# Patient Record
Sex: MALE | Race: WHITE | NOT HISPANIC OR LATINO | Employment: FULL TIME | URBAN - METROPOLITAN AREA
[De-identification: names, ages, dates, MRNs, and addresses within clinical notes are randomized per-mention and may not be internally consistent; named-entity substitution may affect disease eponyms.]

---

## 2017-05-04 ENCOUNTER — OFFICE VISIT (OUTPATIENT)
Dept: FAMILY MEDICINE | Facility: OTHER | Age: 37
End: 2017-05-04
Payer: COMMERCIAL

## 2017-05-04 VITALS
HEIGHT: 67 IN | WEIGHT: 165 LBS | BODY MASS INDEX: 25.9 KG/M2 | SYSTOLIC BLOOD PRESSURE: 134 MMHG | OXYGEN SATURATION: 98 % | HEART RATE: 61 BPM | DIASTOLIC BLOOD PRESSURE: 87 MMHG

## 2017-05-04 DIAGNOSIS — R42 DIZZINESS: ICD-10-CM

## 2017-05-04 DIAGNOSIS — R20.9 DISTURBANCE OF SKIN SENSATION: ICD-10-CM

## 2017-05-04 DIAGNOSIS — R23.2 HOT FLASHES: ICD-10-CM

## 2017-05-04 DIAGNOSIS — R20.0 NUMBNESS: Primary | ICD-10-CM

## 2017-05-04 LAB
ALBUMIN SERPL-MCNC: 4 G/DL (ref 3.4–5)
ALP SERPL-CCNC: 107 U/L (ref 40–150)
ALT SERPL W P-5'-P-CCNC: 58 U/L (ref 0–70)
ANION GAP SERPL CALCULATED.3IONS-SCNC: 10 MMOL/L (ref 3–14)
AST SERPL W P-5'-P-CCNC: 25 U/L (ref 0–45)
BILIRUB SERPL-MCNC: 1 MG/DL (ref 0.2–1.3)
BUN SERPL-MCNC: 13 MG/DL (ref 7–30)
CALCIUM SERPL-MCNC: 8.7 MG/DL (ref 8.5–10.1)
CHLORIDE SERPL-SCNC: 104 MMOL/L (ref 94–109)
CO2 SERPL-SCNC: 25 MMOL/L (ref 20–32)
CREAT SERPL-MCNC: 0.64 MG/DL (ref 0.66–1.25)
D DIMER PPP FEU-MCNC: 0.3 UG/ML FEU (ref 0–0.5)
ERYTHROCYTE [DISTWIDTH] IN BLOOD BY AUTOMATED COUNT: 12.8 % (ref 10–15)
GFR SERPL CREATININE-BSD FRML MDRD: ABNORMAL ML/MIN/1.7M2
GLUCOSE SERPL-MCNC: 122 MG/DL (ref 70–99)
HCT VFR BLD AUTO: 43.3 % (ref 40–53)
HGB BLD-MCNC: 15.5 G/DL (ref 13.3–17.7)
MCH RBC QN AUTO: 29.3 PG (ref 26.5–33)
MCHC RBC AUTO-ENTMCNC: 35.8 G/DL (ref 31.5–36.5)
MCV RBC AUTO: 82 FL (ref 78–100)
PLATELET # BLD AUTO: 205 10E9/L (ref 150–450)
POTASSIUM SERPL-SCNC: 4.1 MMOL/L (ref 3.4–5.3)
PROT SERPL-MCNC: 7.8 G/DL (ref 6.8–8.8)
RBC # BLD AUTO: 5.29 10E12/L (ref 4.4–5.9)
SODIUM SERPL-SCNC: 139 MMOL/L (ref 133–144)
TSH SERPL DL<=0.005 MIU/L-ACNC: 1.98 MU/L (ref 0.4–4)
WBC # BLD AUTO: 4.8 10E9/L (ref 4–11)

## 2017-05-04 PROCEDURE — 99214 OFFICE O/P EST MOD 30 MIN: CPT | Performed by: FAMILY MEDICINE

## 2017-05-04 PROCEDURE — 85379 FIBRIN DEGRADATION QUANT: CPT | Performed by: FAMILY MEDICINE

## 2017-05-04 PROCEDURE — 93000 ELECTROCARDIOGRAM COMPLETE: CPT | Performed by: FAMILY MEDICINE

## 2017-05-04 PROCEDURE — 36415 COLL VENOUS BLD VENIPUNCTURE: CPT | Performed by: FAMILY MEDICINE

## 2017-05-04 PROCEDURE — 80050 GENERAL HEALTH PANEL: CPT | Performed by: FAMILY MEDICINE

## 2017-05-04 ASSESSMENT — ANXIETY QUESTIONNAIRES
5. BEING SO RESTLESS THAT IT IS HARD TO SIT STILL: NOT AT ALL
IF YOU CHECKED OFF ANY PROBLEMS ON THIS QUESTIONNAIRE, HOW DIFFICULT HAVE THESE PROBLEMS MADE IT FOR YOU TO DO YOUR WORK, TAKE CARE OF THINGS AT HOME, OR GET ALONG WITH OTHER PEOPLE: NOT DIFFICULT AT ALL
2. NOT BEING ABLE TO STOP OR CONTROL WORRYING: NOT AT ALL
7. FEELING AFRAID AS IF SOMETHING AWFUL MIGHT HAPPEN: NOT AT ALL
6. BECOMING EASILY ANNOYED OR IRRITABLE: NOT AT ALL
3. WORRYING TOO MUCH ABOUT DIFFERENT THINGS: SEVERAL DAYS
GAD7 TOTAL SCORE: 1
1. FEELING NERVOUS, ANXIOUS, OR ON EDGE: NOT AT ALL

## 2017-05-04 ASSESSMENT — PAIN SCALES - GENERAL: PAINLEVEL: NO PAIN (0)

## 2017-05-04 ASSESSMENT — PATIENT HEALTH QUESTIONNAIRE - PHQ9: 5. POOR APPETITE OR OVEREATING: NOT AT ALL

## 2017-05-04 NOTE — PROGRESS NOTES
SUBJECTIVE:                                                    Catrachito Sawant is a 37 year old male who presents to clinic today for the following health issues:      Dizziness     Onset: Today driving to work     Description:   Do you feel faint:  YES  Does it feel like the surroundings (bed, room) are moving: YES  Unsteady/off balance: YES  Have you passed out or fallen: no - felt like he was going to pass out or fall asleep     Intensity: moderate    Progression of Symptoms:  improving    Accompanying Signs & Symptoms:  Heart palpitations: Unsure  Nausea, vomiting: YES  Weakness in arms or legs: YES  Fatigue: YES  Vision or speech changes: YES- blurry and things were spinning  Ringing in ears (Tinnitus): no   Hearing Loss: no    History:   Head trauma/concussion hx: no   Previous similar symptoms: YES- 1 month ago   Recent bleeding history: no     Precipitating factors:   Worse with activity or head movement: YES  Any new medications (BP?): no   Alcohol/drug abuse/withdrawal: no     Alleviating factors:   Does staying in a fixed position give relief:  YES       Therapies Tried and outcome: NA    Pt had some discomfort in his right groin on the way to work.  While driving, the pain got somewhat worse, started to feel hot, dizzy, arms started to tingle.      He pulled over, rested for a bit, thought he was going to pass out, and then came to the clinic to be evaluated.  He'd also tried drinking a soda to see if that would help and didn't note any improvement.    He had something similar happen a few weeks ago on the way home from work that he attributed to stress from a long day at work.  This event lasted about 30 minutes.      Problem list and histories reviewed & adjusted, as indicated.  Additional history: as documented    No current outpatient prescriptions on file.     No Known Allergies  No lab results found.   BP Readings from Last 3 Encounters:   05/04/17 134/87   11/12/16 127/83   02/25/14 121/79    Wt  "Readings from Last 3 Encounters:   05/04/17 165 lb (74.8 kg)            Family History   Problem Relation Age of Onset     DIABETES Mother      DIABETES Brother      Coronary Artery Disease No family hx of          Social History   Substance Use Topics     Smoking status: Former Smoker     Start date: 1/1/2009     Smokeless tobacco: Never Used     Alcohol use No           Reviewed and updated as needed this visit by clinical staff  Tobacco  Allergies  Meds  Problems       Reviewed and updated as needed this visit by Provider  Allergies  Meds  Problems          ROS:  Constitutional, HEENT, cardiovascular, pulmonary, gi and gu systems are negative, except as otherwise noted.    OBJECTIVE:                                                    /87 (BP Location: Right arm, Patient Position: Chair, Cuff Size: Adult Regular)  Pulse 61  Ht 5' 7\" (1.702 m)  Wt 165 lb (74.8 kg)  SpO2 98%  BMI 25.84 kg/m2  Body mass index is 25.84 kg/(m^2).  GENERAL: healthy, alert and no distress  NECK: no adenopathy, no asymmetry, masses, or scars and thyroid normal to palpation  RESP: lungs clear to auscultation - no rales, rhonchi or wheezes  CV: regular rate and rhythm, normal S1 S2, no S3 or S4, no murmur, click or rub, no peripheral edema and peripheral pulses strong  ABDOMEN: soft, nontender, no hepatosplenomegaly, no masses and bowel sounds normal  MS: no gross musculoskeletal defects noted, no edema    Diagnostic Test Results:  Results for orders placed or performed in visit on 05/04/17   CBC with platelets   Result Value Ref Range    WBC 4.8 4.0 - 11.0 10e9/L    RBC Count 5.29 4.4 - 5.9 10e12/L    Hemoglobin 15.5 13.3 - 17.7 g/dL    Hematocrit 43.3 40.0 - 53.0 %    MCV 82 78 - 100 fl    MCH 29.3 26.5 - 33.0 pg    MCHC 35.8 31.5 - 36.5 g/dL    RDW 12.8 10.0 - 15.0 %    Platelet Count 205 150 - 450 10e9/L        ASSESSMENT/PLAN:                                                        ICD-10-CM    1. Numbness R20.0 EKG " 12-lead complete w/read - Clinics     Comprehensive metabolic panel     CBC with platelets     TSH with free T4 reflex     D dimer, quantitative   2. Dizziness R42 Comprehensive metabolic panel     CBC with platelets     TSH with free T4 reflex     D dimer, quantitative   3. Hot flashes R23.2 Comprehensive metabolic panel     CBC with platelets     TSH with free T4 reflex     D dimer, quantitative   4. Disturbance of skin sensation R20.9 Comprehensive metabolic panel     CBC with platelets     TSH with free T4 reflex     D dimer, quantitative     Unclear cause for pt's symptoms.  He doesn't have a lot of risk factors for MI, EKG is reassuring other than some flattening of T-waves.  Pt improved with rest.  Did give one dose of  just in case this was cardiac.  Suspect that this was either panic attack (but pt has no underlying anxiety based on MIRANDA-7) or vasovagal reaction, possibly to pain.  Will obtain labs to screen for some other possible causes.  If these are normal, will send pt for stress test.  Could also consider event monitor, but events have been fairly infrequent to date.  Follow up if not continuing to improve.            Patient Instructions   Thank you for visiting [facility]    We'll let you know your lab results as soon as we can.     Take a daily aspirin until stress test is done if all of your labs are normal.    If you have chest pain, shortness of breath or other clear worsening, then go to the ER.    Please make an appointment with your either myself or your provider  in 2-4 weeks for follow up.       If you had imaging scheduled please refer to your radiology prep sheet.    Appointment    Date_______________     Time_____________    Day:   M TU W TH F    With____________________________    Location_________________________    If you need medication refills, please contact your pharmacy 3 days before your prescriptions runs out. If you are out of refills, your  pharmacy will contact contact the clinic.    Contact us or return if questions or concerns.     -Your Care Team:  MD Melissa Ross PA-C Folake Falaki, MD Anoshirvan Mazhari, MD Kelly White, CNP    General information about your clinic      Clinic hours:     Lab hours:  Phone [phone]  Monday 7:30 am-7 pm    Monday 8:30 am-6:30 pm  Tuesday-Friday 7:30 am-5 pm   Tuesday-Friday 8:30 am-4:30 pm    Pharmacy hours:  Phone [phone]  Monday 8:30 am-7pm  Tuesday-Friday 8:30am-6 pm                                       Mychart assistance [phone]        We would like to hear from you, how was your visit today?    Sarah Robles  Patient Information Supervisor   Patient Care Supervisor  [facility], and [facility]  [phone]         Miguel Angel Staples MD, MD  [facility]

## 2017-05-04 NOTE — PROGRESS NOTES
Catrachito,    All of your labs were normal for you except blood sugar was a little high (possibly from your recent soda).  We'll get a stress test ordered for you.  Let me know if questions or concerns.    Have a nice day!    Dr. Staples

## 2017-05-04 NOTE — LETTER
[facility]  [id number] [place] [id number]  Phone: [phone]    May 4, 2017        Catrachito Sawant  [address] [place] [id number]          To whom it may concern:    RE: Catrachito Sawant    Patient was seen and treated today at our clinic and missed work.    Please contact me for questions or concerns.      Sincerely,        Miguel Angel Staples MD

## 2017-05-04 NOTE — PATIENT INSTRUCTIONS
Thank you for visiting [facility]    We'll let you know your lab results as soon as we can.     Take a daily aspirin until stress test is done if all of your labs are normal.    If you have chest pain, shortness of breath or other clear worsening, then go to the ER.    Please make an appointment with your either myself or your provider  in 2-4 weeks for follow up.       If you had imaging scheduled please refer to your radiology prep sheet.    Appointment    Date_______________     Time_____________    Day:   M TU W TH F    With____________________________    Location_________________________    If you need medication refills, please contact your pharmacy 3 days before your prescriptions runs out. If you are out of refills, your pharmacy will contact contact the clinic.    Contact us or return if questions or concerns.     -Your Care Team:  MD Melissa Ross PA-C Folake Falaki, MD Anoshirvan Mazhari, MD Kelly White, BRYANNA    General information about your clinic      Clinic hours:     Lab hours:  Phone [phone]  Monday 7:30 am-7 pm    Monday 8:30 am-6:30 pm  Tuesday-Friday 7:30 am-5 pm   Tuesday-Friday 8:30 am-4:30 pm    Pharmacy hours:  Phone [phone]  Monday 8:30 am-7pm  Tuesday-Friday 8:30am-6 pm                                       Mychart assistance [phone]        We would like to hear from you, how was your visit today?    Sarah Robles  Patient Information Supervisor   Patient Care Supervisor  [facility] [place], and [facility] [place], and [facility]  [phone]

## 2017-05-04 NOTE — NURSING NOTE
"Chief Complaint   Patient presents with     Dizziness     Numbness       Initial /87 (BP Location: Right arm, Patient Position: Chair, Cuff Size: Adult Regular)  Pulse 61  Ht 5' 7\" (1.702 m)  Wt 165 lb (74.8 kg)  SpO2 98%  BMI 25.84 kg/m2 Estimated body mass index is 25.84 kg/(m^2) as calculated from the following:    Height as of this encounter: 5' 7\" (1.702 m).    Weight as of this encounter: 165 lb (74.8 kg).  Medication Reconciliation: complete   Kimani Villasenor MA  May 4, 2017      "

## 2017-05-04 NOTE — MR AVS SNAPSHOT
After Visit Summary   5/4/2017    Catrachito Sawant    MRN: [id number]           Patient Information     Date Of Birth          1980        Visit Information        Provider Department      5/4/2017 9:30 AM Miguel Angel Staples MD [facility]        Today's Diagnoses     Numbness    -  1    Hot flashes        Disturbance of skin sensation        Dizziness          Care Instructions    Thank you for visiting [facility]    We'll let you know your lab results as soon as we can.     Take a daily aspirin until stress test is done if all of your labs are normal.    If you have chest pain, shortness of breath or other clear worsening, then go to the ER.    Please make an appointment with your either myself or your provider  in 2-4 weeks for follow up.       If you had imaging scheduled please refer to your radiology prep sheet.    Appointment    Date_______________     Time_____________    Day:   M TU W TH F    With____________________________    Location_________________________    If you need medication refills, please contact your pharmacy 3 days before your prescriptions runs out. If you are out of refills, your pharmacy will contact contact the clinic.    Contact us or return if questions or concerns.     -Your Care Team:  MD Melissa Ross PA-C Folake Falaki, MD Anoshirvan Mazhari, MD Kelly White, CNP    General information about your clinic      Clinic hours:     Lab hours:  Phone [phone]  Monday 7:30 am-7 pm    Monday 8:30 am-6:30 pm  Tuesday-Friday 7:30 am-5 pm   Tuesday-Friday 8:30 am-4:30 pm    Pharmacy hours:  Phone [phone]  Monday 8:30 am-7pm  Tuesday-Friday 8:30am-6 pm                                       Mychart assistance [phone]        We would like to hear from you, how was your visit today?    Sarah Robles  Patient Information Supervisor   Patient Care Supervisor  [facility],  "and [facility]  [phone]           Follow-ups after your visit        Who to contact     If you have questions or need follow up information about today's clinic visit or your schedule please contact [facility] directly at [phone].  Normal or non-critical lab and imaging results will be communicated to you by MyChart, letter or phone within 4 business days after the clinic has received the results. If you do not hear from us within 7 days, please contact the clinic through [facility] or phone. If you have a critical or abnormal lab result, we will notify you by phone as soon as possible.  Submit refill requests through [employer] or call your pharmacy and they will forward the refill request to us. Please allow 3 business days for your refill to be completed.          Additional Information About Your Visit        Fantex Information     Fantex lets you send messages to your doctor, view your test results, renew your prescriptions, schedule appointments and more. To sign up, go to [email or web address] . Click on \"Log in\" on the left side of the screen, which will take you to the Welcome page. Then click on \"Sign up Now\" on the right side of the page.     You will be asked to enter the access code listed below, as well as some personal information. Please follow the directions to create your username and password.     Your access code is: [email or web address]  Expires: 2017  9:55 AM     Your access code will  in 90 days. If you need help or a new code, please call your [facility] or [phone].        Care EveryWhere ID     This is your Care EveryWhere ID. This could be used by other organizations to access your [place] medical records  [id number]        Your Vitals Were     Pulse Height Pulse Oximetry BMI (Body Mass Index)          61 5' 7\" (1.702 m) 98% 25.84 kg/m2         Blood Pressure from Last 3 Encounters: "   05/04/17 134/87   11/12/16 127/83   02/25/14 121/79    Weight from Last 3 Encounters:   05/04/17 165 lb (74.8 kg)              We Performed the Following     CBC with platelets     Comprehensive metabolic panel     D dimer, quantitative     EKG 12-lead complete w/read - Clinics     TSH with free T4 reflex        Primary Care Provider Office Phone #    [facility] [phone]       No address on file        Thank you!     Thank you for choosing [facility]  for your care. Our goal is always to provide you with excellent care. Hearing back from our patients is one way we can continue to improve our services. Please take a few minutes to complete the written survey that you may receive in the mail after your visit with us. Thank you!             Your Updated Medication List - Protect others around you: Learn how to safely use, store and throw away your medicines at www.disposemymeds.org.      Notice  As of 5/4/2017  9:55 AM    You have not been prescribed any medications.

## 2017-05-05 ASSESSMENT — ANXIETY QUESTIONNAIRES: GAD7 TOTAL SCORE: 1

## 2017-05-05 ASSESSMENT — PATIENT HEALTH QUESTIONNAIRE - PHQ9: SUM OF ALL RESPONSES TO PHQ QUESTIONS 1-9: 1

## 2017-05-09 ENCOUNTER — TELEPHONE (OUTPATIENT)
Dept: FAMILY MEDICINE | Facility: OTHER | Age: 37
End: 2017-05-09

## 2017-05-09 DIAGNOSIS — R42 DIZZINESS: Primary | ICD-10-CM

## 2017-05-09 NOTE — TELEPHONE ENCOUNTER
ICD-10-CM    1. Dizziness R42 Exercise Stress Echocardiogram     Order placed, please set up appointment for patient.  Thank you.    Electronically signed:  Keli Mackay M.D.

## 2017-05-09 NOTE — TELEPHONE ENCOUNTER
Patient went to schedule stress test, there was no order placed. It is in patients chart that Dr. Staples wanted patient to do this will order the test so patient can schedule. Please place orders and sign.  Rima Elizalde CMA ([facility])

## 2017-05-09 NOTE — TELEPHONE ENCOUNTER
Left message for patient to call, please help him schedule Stress Echo, order is placed or have patient call [phone], thanks  Sheree Muñoz RT (R)

## 2017-05-10 NOTE — TELEPHONE ENCOUNTER
LMTC: See message below. Patient has already scheduled test will close encounter. If patient returns call please inform him we were attempting to reach him to let him know the order had been signed.  Rima Elizalde CMA ([facility])

## 2017-05-17 ENCOUNTER — HOSPITAL ENCOUNTER (OUTPATIENT)
Dept: CARDIOLOGY | Facility: CLINIC | Age: 37
Discharge: HOME | End: 2017-05-17
Attending: FAMILY MEDICINE | Admitting: FAMILY MEDICINE
Payer: COMMERCIAL

## 2017-05-17 DIAGNOSIS — R42 DIZZINESS: ICD-10-CM

## 2017-05-17 PROCEDURE — 93321 DOPPLER ECHO F-UP/LMTD STD: CPT | Mod: 26 | Performed by: INTERNAL MEDICINE

## 2017-05-17 PROCEDURE — 93016 CV STRESS TEST SUPVJ ONLY: CPT | Performed by: OBSTETRICS & GYNECOLOGY

## 2017-05-17 PROCEDURE — 93018 CV STRESS TEST I&R ONLY: CPT | Performed by: INTERNAL MEDICINE

## 2017-05-17 PROCEDURE — 93325 DOPPLER ECHO COLOR FLOW MAPG: CPT | Mod: 26 | Performed by: INTERNAL MEDICINE

## 2017-05-17 PROCEDURE — 93017 CV STRESS TEST TRACING ONLY: CPT | Performed by: REHABILITATION PRACTITIONER

## 2017-05-17 PROCEDURE — 93016 CV STRESS TEST SUPVJ ONLY: CPT | Performed by: INTERNAL MEDICINE

## 2017-05-17 PROCEDURE — 40000264 ECHO STRESS WITH OPTISON

## 2017-05-17 PROCEDURE — 93350 STRESS TTE ONLY: CPT | Mod: 26 | Performed by: INTERNAL MEDICINE

## 2017-05-17 PROCEDURE — 25500064 ZZH RX 255 OP 636: Performed by: FAMILY MEDICINE

## 2017-05-17 PROCEDURE — 93018 CV STRESS TEST I&R ONLY: CPT | Performed by: OBSTETRICS & GYNECOLOGY

## 2017-05-17 RX ADMIN — HUMAN ALBUMIN MICROSPHERES AND PERFLUTREN 3 ML: 10; .22 INJECTION, SOLUTION INTRAVENOUS at 09:24

## 2017-05-19 ENCOUNTER — TELEPHONE (OUTPATIENT)
Dept: FAMILY MEDICINE | Facility: OTHER | Age: 37
End: 2017-05-19

## 2017-05-19 NOTE — TELEPHONE ENCOUNTER
LMTC, please advise message below  Sheree Muñoz RT (R)      Notes Recorded by Keli Mackay MD on 5/19/2017 at 1:20 PM  Stress echo with in normal range.  If symptoms persists, please notify and follow up.  If any questions, I can call.    Electronically signed:  Keli Mackay M.D.

## 2017-07-26 ENCOUNTER — TELEPHONE (OUTPATIENT)
Dept: FAMILY MEDICINE | Facility: CLINIC | Age: 37
End: 2017-07-26

## 2017-07-26 NOTE — TELEPHONE ENCOUNTER
Per Cassandra Tavera can see patient on August 17th at 2, please make for a 40 minute appointment and route it back to Cassandra so they can send out paperwork to the patient

## 2017-07-26 NOTE — TELEPHONE ENCOUNTER
Reason for Call:  Same Day Appointment, Requested Provider:  Jesse Tavera M.D.    PCP: Clinic, [facility]    Reason for visit: work in-patient is calling requesting a vas but first available for a consult isnt until October. Wondering if he could do consult and procedure in the same day prior to his wifes due date which is about September 13th. Aware that AF is out until 7/31 please advise when availabel    Duration of symptoms:     Have you been treated for this in the past? No    Additional comments: patient states that his wife sees Dr. Tavera and requesting to see him for this    Can we leave a detailed message on this number? YES    Phone number patient can be reached at: Home number on file [phone] (home)    Best Time: any    Call taken on 7/26/2017 at 8:51 AM by Marlyn Mariano

## 2017-08-02 ENCOUNTER — OFFICE VISIT (OUTPATIENT)
Dept: FAMILY MEDICINE | Facility: CLINIC | Age: 37
End: 2017-08-02
Payer: COMMERCIAL

## 2017-08-02 VITALS
DIASTOLIC BLOOD PRESSURE: 84 MMHG | OXYGEN SATURATION: 98 % | RESPIRATION RATE: 20 BRPM | SYSTOLIC BLOOD PRESSURE: 114 MMHG | WEIGHT: 151.9 LBS | TEMPERATURE: 98.1 F | BODY MASS INDEX: 23.79 KG/M2 | HEART RATE: 60 BPM

## 2017-08-02 DIAGNOSIS — Z30.2 ENCOUNTER FOR VASECTOMY: Primary | ICD-10-CM

## 2017-08-02 PROCEDURE — 99213 OFFICE O/P EST LOW 20 MIN: CPT | Performed by: FAMILY MEDICINE

## 2017-08-02 RX ORDER — ALPRAZOLAM 1 MG
TABLET ORAL
Qty: 2 TABLET | Refills: 0 | Status: SHIPPED | OUTPATIENT
Start: 2017-08-02 | End: 2019-12-12

## 2017-08-02 ASSESSMENT — PAIN SCALES - GENERAL: PAINLEVEL: NO PAIN (0)

## 2017-08-02 NOTE — NURSING NOTE
"Chief Complaint   Patient presents with     Consult     vasectomy       Initial /84 (BP Location: Left arm, Patient Position: Chair, Cuff Size: Adult Regular)  Pulse 60  Temp 98.1  F (36.7  C) (Temporal)  Resp 20  Wt 151 lb 14.4 oz (68.9 kg)  SpO2 98%  BMI 23.79 kg/m2 Estimated body mass index is 23.79 kg/(m^2) as calculated from the following:    Height as of 5/4/17: 5' 7\" (1.702 m).    Weight as of this encounter: 151 lb 14.4 oz (68.9 kg).  Medication Reconciliation: complete   Anushka Avina CMA     "

## 2017-08-02 NOTE — MR AVS SNAPSHOT
After Visit Summary   8/2/2017    Catrachito Sawant    MRN: [id number]           Patient Information     Date Of Birth          1980        Visit Information        Provider Department      8/2/2017 9:00 AM Alex Haley MD [facility]        Today's Diagnoses     Encounter for vasectomy    -  1       Follow-ups after your visit        Your next 10 appointments already scheduled     Aug 11, 2017  2:40 PM CDT   Vasectomy with Alex Haley MD, NL PROC ROOM ONE [facility]    [address] [place] [id number] [phone]              Who to contact     If you have questions or need follow up information about today's clinic visit or your schedule please contact [facility] directly at [phone].  Normal or non-critical lab and imaging results will be communicated to you by MyChart, letter or phone within 4 business days after the clinic has received the results. If you do not hear from us within 7 days, please contact the clinic through MyChart or phone. If you have a critical or abnormal lab result, we will notify you by phone as soon as possible.  Submit refill requests through [employer] or call your pharmacy and they will forward the refill request to us. Please allow 3 business days for your refill to be completed.          Additional Information About Your Visit        MyChart Information     [employer] gives you secure access to your electronic health record. If you see a primary care provider, you can also send messages to your care team and make appointments. If you have questions, please call your primary care clinic.  If you do not have a primary care provider, please call [phone] and they will assist you.        Care EveryWhere ID     This is your Care EveryWhere ID. This could be used by other organizations to access your [place] medical records  [id number]        Your  Vitals Were     Pulse Temperature Respirations Pulse Oximetry BMI (Body Mass Index)       60 98.1  F (36.7  C) (Temporal) 20 98% 23.79 kg/m2        Blood Pressure from Last 3 Encounters:   08/02/17 114/84   05/04/17 134/87   11/12/16 127/83    Weight from Last 3 Encounters:   08/02/17 151 lb 14.4 oz (68.9 kg)   05/04/17 165 lb (74.8 kg)              Today, you had the following     No orders found for display         Today's Medication Changes          These changes are accurate as of: 8/2/17 12:52 PM.  If you have any questions, ask your nurse or doctor.               Start taking these medicines.        Dose/Directions    ALPRAZolam 1 MG tablet   Commonly known as:  XANAX   Used for:  Encounter for vasectomy   Started by:  Alex Haley MD        Take one dose 30 min prior to procedure, then repeat at procedure if not relaxed   Quantity:  2 tablet   Refills:  0            Where to get your medicines      Some of these will need a paper prescription and others can be bought over the counter.  Ask your nurse if you have questions.     Bring a paper prescription for each of these medications     ALPRAZolam 1 MG tablet                Primary Care Provider Office Phone #    [facility] [phone]       No address on file        Equal Access to Services     MACHO ALEJANDRE AH: Jessica knutsono Sojad, waaxda luqadaha, qaybta kaalmada adeegyada, robyn doran. So [facility] [phone].    ATENCIÓN: Si habla español, tiene a butt disposición servicios gratuitos de asistencia lingüística. Llame al [phone].    We comply with applicable federal civil rights laws and [place] laws. We do not discriminate on the basis of race, color, national origin, age, disability sex, sexual orientation or gender identity.            Thank you!     Thank you for choosing [facility]  for your care. Our goal is always to provide you with excellent care. Hearing  back from our patients is one way we can continue to improve our services. Please take a few minutes to complete the written survey that you may receive in the mail after your visit with us. Thank you!             Your Updated Medication List - Protect others around you: Learn how to safely use, store and throw away your medicines at www.disposemymeds.org.          This list is accurate as of: 8/2/17 12:52 PM.  Always use your most recent med list.                   Brand Name Dispense Instructions for use Diagnosis    ALPRAZolam 1 MG tablet    XANAX    2 tablet    Take one dose 30 min prior to procedure, then repeat at procedure if not relaxed    Encounter for vasectomy

## 2017-08-02 NOTE — PROGRESS NOTES
SUBJECTIVE:  This 37 year old male is in for a vasectomy consultation.  He and his partner have decided they don't want to have any more children. They have 2 children.  We talked about the risks and benefits of the vasectomy; risks being that of potential for bleeding, infection, postoperative discomfort immediately which can last for a number of weeks afterwards, also the development of spermatoceles or sperm granulomas, epididymal cysts and the possibility of failure of the procedure producing failed attempt at sterilization. Number quoted was 1 out of 1000 risk of failure. I went through the procedure with the patient, describing the no scapel technique. No prior procedures on his genitalia.    OBJECTIVE:  On exam, this is a well-developed, well-nourished white male.    /84 (BP Location: Left arm, Patient Position: Chair, Cuff Size: Adult Regular)  Pulse 60  Temp 98.1  F (36.7  C) (Temporal)  Resp 20  Wt 151 lb 14.4 oz (68.9 kg)  SpO2 98%  BMI 23.79 kg/m2    Exam reveals a normal penis, no lesions.  Testes are descended bilaterally.  Both vas deferens were easily identified.  No other abnormalities were noted.      ASSESSMENT:  Undesired male fertility, consult for vasectomy    PLAN:  He will schedule a vasectomy at his convenience for 11 am on Fridays.  He is aware that he will need to take the entire weekend off and have essentially bedrest for two days with ice packs 20-30 minutes out of every hour and ibuprofen for discomfort. He should be on 600-800mg ibuprofen every 6 hrs.  He will take alprazolam 1 mg and repeat that if he needs a second dose once he gets to the clinic. He will contact his insurance for coverage issues and waiting period information.  He left with written instructions on the procedure, recovery, risks and benefits.

## 2017-08-11 ENCOUNTER — OFFICE VISIT (OUTPATIENT)
Dept: FAMILY MEDICINE | Facility: CLINIC | Age: 37
End: 2017-08-11
Payer: COMMERCIAL

## 2017-08-11 DIAGNOSIS — Z30.2 ENCOUNTER FOR VASECTOMY: Primary | ICD-10-CM

## 2017-08-11 PROCEDURE — 88302 TISSUE EXAM BY PATHOLOGIST: CPT | Performed by: FAMILY MEDICINE

## 2017-08-11 PROCEDURE — 55250 REMOVAL OF SPERM DUCT(S): CPT | Performed by: FAMILY MEDICINE

## 2017-08-11 NOTE — MR AVS SNAPSHOT
After Visit Summary   8/11/2017    Catrachito Sawant    MRN: [id number]           Patient Information     Date Of Birth          1980        Visit Information        Provider Department      8/11/2017 2:40 PM Alex Haley MD; NL PROC ROOM ONE [facility]        Today's Diagnoses     Encounter for vasectomy    -  1       Follow-ups after your visit        Who to contact     If you have questions or need follow up information about today's clinic visit or your schedule please contact [facility] directly at [phone].  Normal or non-critical lab and imaging results will be communicated to you by [employer], letter or phone within 4 business days after the clinic has received the results. If you do not hear from us within 7 days, please contact the clinic through [employer] or phone. If you have a critical or abnormal lab result, we will notify you by phone as soon as possible.  Submit refill requests through [employer] or call your pharmacy and they will forward the refill request to us. Please allow 3 business days for your refill to be completed.          Additional Information About Your Visit        MyChart Information     [employer] gives you secure access to your electronic health record. If you see a primary care provider, you can also send messages to your care team and make appointments. If you have questions, please call your primary care clinic.  If you do not have a primary care provider, please call [phone] and they will assist you.        Care EveryWhere ID     This is your Care EveryWhere ID. This could be used by other organizations to access your [facility] medical records  [id number]         Blood Pressure from Last 3 Encounters:   08/02/17 114/84   05/04/17 134/87   11/12/16 127/83    Weight from Last 3 Encounters:   08/02/17 151 lb 14.4 oz (68.9 kg)   05/04/17 165 lb (74.8 kg)              We Performed the Following     Surgical pathology  exam     VASECTOMY UNILAT/BILAT W POSTOP SEMEN        Primary Care Provider Office Phone #    [facility] [phone]       No address on file        Equal Access to Services     MACHO ALEJANDRE : Hadii aad ku hadodalisbianca Pattjad, wadinoda bomerle, qaharita kaalmada lazaro, robyn vásquez laMinegayatri doran. So [facility] [phone].    ATENCIÓN: Si habla español, tiene a butt disposición servicios gratuitos de asistencia lingüística. Llame al [phone].    We comply with applicable federal civil rights laws and [place] laws. We do not discriminate on the basis of race, color, national origin, age, disability sex, sexual orientation or gender identity.            Thank you!     Thank you for choosing [facility]  for your care. Our goal is always to provide you with excellent care. Hearing back from our patients is one way we can continue to improve our services. Please take a few minutes to complete the written survey that you may receive in the mail after your visit with us. Thank you!             Your Updated Medication List - Protect others around you: Learn how to safely use, store and throw away your medicines at www.disposemymeds.org.          This list is accurate as of: 8/11/17  4:54 PM.  Always use your most recent med list.                   Brand Name Dispense Instructions for use Diagnosis    ALPRAZolam 1 MG tablet    XANAX    2 tablet    Take one dose 30 min prior to procedure, then repeat at procedure if not relaxed    Encounter for vasectomy

## 2017-08-11 NOTE — LETTER
[address] [address]  [phone]        August 11, 2017    Regarding:  Catrachito Sawant  [address] [place] [address]              To Whom It May Concern;  Please make him light duty for the next week. Thanks.          Sincerely,        Alex Haley MD

## 2017-08-11 NOTE — PROGRESS NOTES
S:   Catrachito Sawant  comes in today for a vasectomy. The patient had previously been in and we discussed the other options for birth control, the risks and benefits of the procedure. Details of those risks and benefits have been noted in the consent form which has been signed. This includes the potential for bleeding, infection, bruising, potential for sperm granuloma/epididymitis, and failure.    Procedure:   The patient was placed in a supine position on the procedure table. Shave prep was done by the patient at home. He was then sterilely prepped and draped in the usual fashion. The left vas was first identified and brought up to the midline raphae where a small wheal of 1% Lidocaine with epinephrine was placed in the skin overlying the vas and further anesthesia was injected in to the vas sheaths bilaterally. The no scalpel dissector was used to create a skin rent in the midline. The vas clamp was then used to grasp the vas. When a segment of vas was clearly freed from the adventitia and vascular bundle, it was transected with a scissors and each end was cauterized approximately 1cm down into the vas deferens itself to seal it. A small section was sent for pathology identification. The prostatic end was buried within the vas sheath with a purse string suture of 3-0 chromic. No bleeding was noted at the completion of this step and at this time the vasdeferens was returned to the scrotal sac.     Attention was then turned to the opposite side and proceeded in similar fashion. The vas was brought up through the same opening. I proceeded in the same fashion to isolate, transect, cauterize, and bury the right side. The scrotal skin incision was not closed at completion of the procedure. He was placed in a sterile bandage. Final EBL was minimal. Scrotum appeared normal, outside some mild swelling in the area of lidocaine injection.    ASSESSMENT:   1) Uncomplicated male sterilization via vasectomy.     PLAN:   Patient  tolerated the procedure quite well. He will use Ibuprofen 800 mg. p.o. t.i.d. with food x 2 wks. Ice to the scrotum 30 minutes out of every hour for the next 48 hrs. No heavy lifting for three days. The patient will bring in a sample of semen for analysis after a minimum of 10 wks, and will contact me if any problems should arise.

## 2017-08-15 LAB — COPATH REPORT: NORMAL

## 2017-09-26 ENCOUNTER — TELEPHONE (OUTPATIENT)
Dept: FAMILY MEDICINE | Facility: CLINIC | Age: 37
End: 2017-09-26

## 2017-09-26 NOTE — TELEPHONE ENCOUNTER
Reason for Call:  Other call back    Detailed comments: patient is calling with questions in regards to his vas that was done in August. Wondering when he needs to bring a sample in and if he needs to pick anything up for that?    Phone Number Patient can be reached at: Home number on file [phone] (home)    Best Time: any    Can we leave a detailed message on this number? YES    Call taken on 9/26/2017 at 9:08 AM by Marlyn Mariano

## 2017-09-26 NOTE — TELEPHONE ENCOUNTER
Tried to reach patient, left message for patient to call the clinic back.  Patient should bring in the sample within a minimum of 10 weeks from the procedure. Patient was given some sample jars at the time of the procedure, he is to use those. If he does not have those any longer he can stop by the lab and get some again and request some labels for them.    Nancy Garcia, [facility]

## 2017-10-23 ENCOUNTER — TELEPHONE (OUTPATIENT)
Dept: FAMILY MEDICINE | Facility: CLINIC | Age: 37
End: 2017-10-23

## 2017-10-23 DIAGNOSIS — Z98.52 STATUS POST VASECTOMY: ICD-10-CM

## 2017-10-23 DIAGNOSIS — Z98.52 STATUS POST VASECTOMY: Primary | ICD-10-CM

## 2017-10-23 LAB — SPERM P VAS SMN QL MICRO: NORMAL

## 2017-10-23 PROCEDURE — 89321 SEMEN ANAL SPERM DETECTION: CPT | Performed by: FAMILY MEDICINE

## 2017-10-23 NOTE — TELEPHONE ENCOUNTER
Lab calling and stating patient presented to lab with semen sample. Lab needing orders for PVASQ. Order placed, lab questioning if patient needs any further samples brought in? Informed we will wait for results and provider will inform of any further samples being needed.  Nati Almanza LPN

## 2018-08-20 ENCOUNTER — OFFICE VISIT (OUTPATIENT)
Dept: URGENT CARE | Facility: RETAIL CLINIC | Age: 38
End: 2018-08-20
Payer: COMMERCIAL

## 2018-08-20 VITALS
HEART RATE: 66 BPM | SYSTOLIC BLOOD PRESSURE: 113 MMHG | DIASTOLIC BLOOD PRESSURE: 78 MMHG | TEMPERATURE: 98.3 F | OXYGEN SATURATION: 98 %

## 2018-08-20 DIAGNOSIS — B34.9 VIRAL SYNDROME: Primary | ICD-10-CM

## 2018-08-20 PROCEDURE — 99213 OFFICE O/P EST LOW 20 MIN: CPT | Performed by: FAMILY MEDICINE

## 2018-08-20 NOTE — PROGRESS NOTES
SUBJECTIVE:  Catrachito Sawant is a 38 year old male who presents with right ear pain and pressure for 3 day(s).   Severity: mild   Timing:gradual onset and still present  Additional symptoms include sneezing and sore throat.      History of recurrent otitis: no    No past medical history on file.  Current Outpatient Prescriptions   Medication Sig Dispense Refill     ALPRAZolam (XANAX) 1 MG tablet Take one dose 30 min prior to procedure, then repeat at procedure if not relaxed (Patient not taking: Reported on 8/20/2018) 2 tablet 0     History   Smoking Status     Former Smoker     Start date: 1/1/2009   Smokeless Tobacco     Never Used       ROS:   Review of systems negative except as stated above.    OBJECTIVE:  /78  Pulse 66  Temp 98.3  F (36.8  C) (Oral)  SpO2 98%  The right TM is normal: no effusions, no erythema, and normal landmarks     The right auditory canal is normal and without drainage, edema or erythema  The left TM is normal: no effusions, no erythema, and normal landmarks  The left auditory canal is normal and without drainage, edema or erythema  Oropharynx exam is normal: no lesions, erythema, adenopathy or exudate.  GENERAL: no acute distress  EYES: EOMI,  PERRL, conjunctiva clear  NECK: supple, non-tender to palpation, no adenopathy noted  RESP: lungs clear to auscultation - no rales, rhonchi or wheezes  CV: regular rates and rhythm, normal S1 S2, no murmur noted  SKIN: no suspicious lesions or rashes     ASSESSMENT:  URI    PLAN:  No orders of the defined types were placed in this encounter.    Follow up with primary care provider if no improvement.

## 2018-08-20 NOTE — MR AVS SNAPSHOT
After Visit Summary   8/20/2018    Catrachito Sawant    MRN: [id number]           Patient Information     Date Of Birth          1980        Visit Information        Provider Department      8/20/2018 9:10 AM Dawson Villanueva MD [facility]        Today's Diagnoses     Viral syndrome    -  1       Follow-ups after your visit        Who to contact     You can reach your care team any time of the day by calling [phone].  Notification of test results:  If you have an abnormal lab result, we will notify you by phone as soon as possible.         Additional Information About Your Visit        MyChart Information     [employer] gives you secure access to your electronic health record. If you see a primary care provider, you can also send messages to your care team and make appointments. If you have questions, please call your primary care clinic.  If you do not have a primary care provider, please call [phone] and they will assist you.        Care EveryWhere ID     This is your Care EveryWhere ID. This could be used by other organizations to access your [facility] medical records  [id number]        Your Vitals Were     Pulse Temperature Pulse Oximetry             66 98.3  F (36.8  C) (Oral) 98%          Blood Pressure from Last 3 Encounters:   08/20/18 113/78   08/02/17 114/84   05/04/17 134/87    Weight from Last 3 Encounters:   08/02/17 151 lb 14.4 oz (68.9 kg)   05/04/17 165 lb (74.8 kg)              Today, you had the following     No orders found for display       Primary Care Provider Fax #    Physician No Ref-Primary [phone]       No address on file        Equal Access to Services     TARUN ALEJANDRE : Hadii aad ku hadasho Soomaali, waaxda luqadaha, qaybta kaalmada adeegyada, waxay gayle hameed . So [facility] [phone].    ATENCIÓN: Si habla español, tiene a butt disposición servicios gratuitos de asistencia lingüística. Llame al [phone].    We  comply with applicable federal civil rights laws and [place] laws. We do not discriminate on the basis of race, color, national origin, age, disability, sex, sexual orientation, or gender identity.            Thank you!     Thank you for choosing [facility]  for your care. Our goal is always to provide you with excellent care. Hearing back from our patients is one way we can continue to improve our services. Please take a few minutes to complete the written survey that you may receive in the mail after your visit with us. Thank you!             Your Updated Medication List - Protect others around you: Learn how to safely use, store and throw away your medicines at www.disposemymeds.org.          This list is accurate as of 8/20/18  9:12 AM.  Always use your most recent med list.                   Brand Name Dispense Instructions for use Diagnosis    ALPRAZolam 1 MG tablet    XANAX    2 tablet    Take one dose 30 min prior to procedure, then repeat at procedure if not relaxed    Encounter for vasectomy

## 2018-12-10 ENCOUNTER — OFFICE VISIT (OUTPATIENT)
Dept: URGENT CARE | Facility: RETAIL CLINIC | Age: 38
End: 2018-12-10
Payer: COMMERCIAL

## 2018-12-10 VITALS — HEART RATE: 82 BPM | DIASTOLIC BLOOD PRESSURE: 85 MMHG | TEMPERATURE: 98.4 F | SYSTOLIC BLOOD PRESSURE: 126 MMHG

## 2018-12-10 DIAGNOSIS — J02.9 ACUTE PHARYNGITIS, UNSPECIFIED ETIOLOGY: Primary | ICD-10-CM

## 2018-12-10 LAB — S PYO AG THROAT QL IA.RAPID: NORMAL

## 2018-12-10 PROCEDURE — 87880 STREP A ASSAY W/OPTIC: CPT | Mod: QW | Performed by: INTERNAL MEDICINE

## 2018-12-10 PROCEDURE — 87081 CULTURE SCREEN ONLY: CPT | Performed by: INTERNAL MEDICINE

## 2018-12-10 PROCEDURE — 99213 OFFICE O/P EST LOW 20 MIN: CPT | Performed by: INTERNAL MEDICINE

## 2018-12-10 RX ORDER — AZITHROMYCIN 250 MG/1
TABLET, FILM COATED ORAL
Qty: 6 TABLET | Refills: 0 | Status: SHIPPED | OUTPATIENT
Start: 2018-12-10 | End: 2018-12-15

## 2018-12-10 NOTE — PROGRESS NOTES
[facility] Care Progress Note        Alix Marcial MD, MPH  12/10/2018        History:      Catrachito Sawant is a pleasant 38 year old year old male with a chief complaint of sore throat and cough since 11 days ago.   No fever or chills.   No dyspnea or wheezing.   No smoking history.   No headache or neck pain.  No GI or  symptoms.   No MSK symptoms.         Assessment and Plan:          - RAPID STREP SCREEN: negative  - BETA STREP GROUP A R/O CULTURE    Acute broncjitis  - azithromycin (ZITHROMAX) 250 MG tablet; Two tablets first day, then one tablet daily for four days.  Dispense: 6 tablet; Refill: 0    Discussed supportive care with the patient  Advised to increase fluid intake and rest.  Patient was advised to use throat lozenges and gargle with salt water for symptomatic relief.  Tylenol 650 mg po for pain q 6 hours prn  F/u w PCP in 4-5 days, earlier if symptoms worsen.                   Physical Exam:      /85   Pulse 82   Temp 98.4  F (36.9  C) (Oral)      Constitutional: Patient is in no distress The patient is pleasant and cooperative.   HEENT: Head:  Head is atraumatic, normocephalic.    Eyes: Pupils are equal, round and reactive to light and accomodation.  Sclera is non-icteric. No conjunctival injection, or exudate noted. Extraocular motion is intact. Visual acuity is intact bilaterally.  Ears:  External acoustic canals are patent and clear.  There is no erythema and bulging( exudate)  of the ( R/L ) tympanic membrane(s ).   Nose:  Nasal congestion w/o drainage or mucosal ulceration is noted.  Throat:  Oral mucosa is moist.  No oral lesions are noted. Posterior pharyngeal hyperemia w/o exudate noted.     Neck Supple.  There is no cervical lymphadenopathy.  No nuchal rigidity noted.  There is no meningismus.     Cardiovascular: Heart is regular to rate and rhythm.  No murmur is noted.     Lungs: Clear in the anterior and posterior pulmonary fields.   Abdomen: Soft and  non-tender.    Back No flank tenderness is noted.   Extremeties No edema, no calf tenderness.   Neuro: No focal deficit.   Skin No petechiae or purpura is noted.  There is no rash.   Mood Normal              Data:      All new lab and imaging data was reviewed.   Results for orders placed or performed in visit on 12/10/18   RAPID STREP SCREEN   Result Value Ref Range    Rapid Strep A Screen NEG neg

## 2018-12-12 LAB
BACTERIA SPEC CULT: NORMAL
SPECIMEN SOURCE: NORMAL

## 2019-04-18 ENCOUNTER — OFFICE VISIT (OUTPATIENT)
Dept: FAMILY MEDICINE | Facility: CLINIC | Age: 39
End: 2019-04-18
Payer: COMMERCIAL

## 2019-04-18 VITALS
HEIGHT: 68 IN | OXYGEN SATURATION: 99 % | BODY MASS INDEX: 24.69 KG/M2 | SYSTOLIC BLOOD PRESSURE: 122 MMHG | TEMPERATURE: 97.4 F | WEIGHT: 162.9 LBS | RESPIRATION RATE: 14 BRPM | HEART RATE: 88 BPM | DIASTOLIC BLOOD PRESSURE: 68 MMHG

## 2019-04-18 DIAGNOSIS — Z53.9 ERRONEOUS ENCOUNTER--DISREGARD: Primary | ICD-10-CM

## 2019-04-18 ASSESSMENT — ENCOUNTER SYMPTOMS
HEADACHES: 0
SORE THROAT: 0
WEAKNESS: 0
HEMATOCHEZIA: 0
PARESTHESIAS: 0
SHORTNESS OF BREATH: 0
NERVOUS/ANXIOUS: 0
ABDOMINAL PAIN: 0
FREQUENCY: 0
FEVER: 0
EYE PAIN: 0
CHILLS: 0
PALPITATIONS: 0
DIZZINESS: 0
ARTHRALGIAS: 0
HEARTBURN: 1
NAUSEA: 0
MYALGIAS: 0
CONSTIPATION: 0
DIARRHEA: 0
COUGH: 0
DYSURIA: 0
HEMATURIA: 0
JOINT SWELLING: 0

## 2019-04-18 ASSESSMENT — PATIENT HEALTH QUESTIONNAIRE - PHQ9
SUM OF ALL RESPONSES TO PHQ QUESTIONS 1-9: 16
SUM OF ALL RESPONSES TO PHQ QUESTIONS 1-9: 16

## 2019-04-18 ASSESSMENT — MIFFLIN-ST. JEOR: SCORE: 1625.79

## 2019-04-19 ASSESSMENT — PATIENT HEALTH QUESTIONNAIRE - PHQ9: SUM OF ALL RESPONSES TO PHQ QUESTIONS 1-9: 16

## 2019-12-12 ENCOUNTER — HOSPITAL ENCOUNTER (OUTPATIENT)
Dept: GENERAL RADIOLOGY | Facility: CLINIC | Age: 39
End: 2019-12-12
Attending: FAMILY MEDICINE
Payer: COMMERCIAL

## 2019-12-12 ENCOUNTER — OFFICE VISIT (OUTPATIENT)
Dept: FAMILY MEDICINE | Facility: CLINIC | Age: 39
End: 2019-12-12
Payer: COMMERCIAL

## 2019-12-12 VITALS
SYSTOLIC BLOOD PRESSURE: 130 MMHG | RESPIRATION RATE: 16 BRPM | BODY MASS INDEX: 24.68 KG/M2 | HEART RATE: 100 BPM | WEIGHT: 160 LBS | OXYGEN SATURATION: 98 % | TEMPERATURE: 98.2 F | DIASTOLIC BLOOD PRESSURE: 78 MMHG

## 2019-12-12 DIAGNOSIS — R07.89 CHEST WALL PAIN: ICD-10-CM

## 2019-12-12 DIAGNOSIS — S20.211A CONTUSION OF RIGHT CHEST WALL, INITIAL ENCOUNTER: Primary | ICD-10-CM
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
## 2019-12-12 PROCEDURE — 71101 X-RAY EXAM UNILAT RIBS/CHEST: CPT | Mod: TC

## 2019-12-12 PROCEDURE — 99213 OFFICE O/P EST LOW 20 MIN: CPT | Performed by: FAMILY MEDICINE

## 2019-12-12 RX ORDER — HYDROCODONE BITARTRATE AND ACETAMINOPHEN 5; 325 MG/1; MG/1
1 TABLET ORAL EVERY 6 HOURS PRN
Qty: 10 TABLET | Refills: 0 | Status: SHIPPED | OUTPATIENT
Start: 2019-12-12 | End: 2019-12-15

## 2019-12-12 ASSESSMENT — PAIN SCALES - GENERAL: PAINLEVEL: SEVERE PAIN (6)

## 2019-12-12 ASSESSMENT — PATIENT HEALTH QUESTIONNAIRE - PHQ9: SUM OF ALL RESPONSES TO PHQ QUESTIONS 1-9: 0

## 2019-12-12 NOTE — PROGRESS NOTES
Subjective     Catrachito Sawant is a 39 year old male who presents to clinic today for the following health issues:    HPI   Chief Complaint   Patient presents with     Fall     x2 days ago fell onto concrete steps and hit chin on chairs. Denies hitting head no symptoms.  right sided rib pain unable to perfrom at work due to the pain.       Catrachito is here today with concerns of broken ribs.  Stated that he slipped on a cement stairs, landed on the right chest on the steps.  Had a lot of pain right away and felt a crack/pop when he landed.  Since then, the pain is unbearable and is not getting any better.  Worse with coughing, deep breathing or sneezing.  Ibuprofen with minimal effect.  No nausea or vomiting.  No hemoptysis or hematemesis.  No shortness of breath.  Otherwise healthy, no other concerns.    Current Outpatient Medications   Medication Sig Dispense Refill     HYDROcodone-acetaminophen (NORCO) 5-325 MG tablet Take 1 tablet by mouth every 6 hours as needed for severe pain 10 tablet 0     No Known Allergies      Reviewed and updated as needed this visit by Provider         Review of Systems   ROS COMP: Constitutional, HEENT, cardiovascular, pulmonary, gi and gu systems are negative, except as otherwise noted.      Objective    /78   Pulse 100   Temp 98.2  F (36.8  C) (Temporal)   Resp 16   Wt 72.6 kg (160 lb)   SpO2 98%   BMI 24.68 kg/m    Body mass index is 24.68 kg/m .  Physical Exam   GENERAL: healthy, alert and no distress  NECK: no adenopathy, no tender with palpation to the cervical spine or its paraspinous muscle.  RESP: lungs clear to auscultation - no rales, rhonchi or wheezes.  Good respiratory effort throughout.  CV: regular rate and rhythm, no murmur  ABDOMEN: soft, nontender, normal bowel sounds  MS: no gross musculoskeletal defects noted, no edema.  Both hands equally in strength.  Tender palpation to the right chest with guarding.    Diagnostic Test Results:  Labs reviewed in  "Epic  No results found for any visits on 12/12/19.    RIBS AND CHEST RIGHT THREE VIEWS December 12, 2019 8:38 AM      HISTORY: Chest wall pain. Fall, initial encounter.     COMPARISON: None.     FINDINGS: The lungs are clear. No pleural effusions or pneumothorax.  Heart size and vascularity are normal. No fracture is identified.  Specifically, no right rib fracture is seen.                                                                      IMPRESSION: No evidence of acute cardiopulmonary disease or rib  fracture is seen.          SYMONE CAMARGO MD     Assessment & Plan       ICD-10-CM    1. Contusion of right chest wall, initial encounter S20.211A XR Ribs & Chest Right G/E 3 Views     HYDROcodone-acetaminophen (NORCO) 5-325 MG tablet   2. Fall, initial encounter W19.XXXA XR Ribs & Chest Right G/E 3 Views     HYDROcodone-acetaminophen (NORCO) 5-325 MG tablet     Informed patient that he has a contusion of his chest; no rib fracture.  Discussed with him about the nature of the condition.  Encourage him to continue with his normal activities as tolerated.  Encourage frequent deep inspirations. Continue with Tylenol or Naproxen as needed for pain.  Xray showed no pneumothorax.  Vicodin as needed for sever pain.  Instructed not to operate any machine while taking this med, especially while he is taking the Norco. Call in if not improve in next several days. He feels comfortable with the plan.     BMI:   Estimated body mass index is 24.68 kg/m  as calculated from the following:    Height as of 4/18/19: 1.715 m (5' 7.52\").    Weight as of this encounter: 72.6 kg (160 lb).       No follow-ups on file.    Verna Swanson Mai, MD  [facility]        "

## 2019-12-12 NOTE — PROGRESS NOTES
PHQ-9 score:    PHQ-9 SCORE 12/12/2019   PHQ-9 Total Score MyChart -   PHQ-9 Total Score 0

## 2019-12-12 NOTE — LETTER
[address] [address]  [phone]        December 12, 2019    Regarding:  Catrachito Sawant  [address] [place] [address]              To Whom It May Concern;  Please allow Catrachito to be on light duty at work until 12/20/2019 due to a medical concern.  If you have any questions or concerns please feel free to contact me at the office at then number listed above.           Sincerely,        Verna Swanson Mai, MD

## 2020-03-02 ENCOUNTER — HEALTH MAINTENANCE LETTER (OUTPATIENT)
Age: 40
End: 2020-03-02

## 2020-10-09 ENCOUNTER — ALLIED HEALTH/NURSE VISIT (OUTPATIENT)
Dept: FAMILY MEDICINE | Facility: CLINIC | Age: 40
End: 2020-10-09
Payer: COMMERCIAL

## 2020-10-09 VITALS — HEIGHT: 67 IN | BODY MASS INDEX: 25.44 KG/M2

## 2020-10-09 DIAGNOSIS — Z23 NEED FOR PROPHYLACTIC VACCINATION AND INOCULATION AGAINST INFLUENZA: Primary | ICD-10-CM

## 2020-10-09 DIAGNOSIS — Z23 NEED FOR VACCINATION: ICD-10-CM

## 2020-10-09 PROCEDURE — 90651 9VHPV VACCINE 2/3 DOSE IM: CPT

## 2020-10-09 PROCEDURE — 90686 IIV4 VACC NO PRSV 0.5 ML IM: CPT

## 2020-10-09 PROCEDURE — 90471 IMMUNIZATION ADMIN: CPT

## 2020-10-09 PROCEDURE — 99207 PR NO CHARGE NURSE ONLY: CPT

## 2020-10-09 PROCEDURE — 90472 IMMUNIZATION ADMIN EACH ADD: CPT

## 2020-10-09 NOTE — NURSING NOTE
Prior to immunization administration, verified patients identity using patient s name and date of birth. Please see Immunization Activity for additional information.     Screening Questionnaire for Adult Immunization  Patient wanting HPV. Informed patient vaccine may or may not be covered by insurance.       Are you sick today?   No   Do you have allergies to medications, food, a vaccine component or latex?   No   Have you ever had a serious reaction after receiving a vaccination?   No   Do you have a long-term health problem with heart, lung, kidney, or metabolic disease (e.g., diabetes), asthma, a blood disorder, no spleen, complement component deficiency, a cochlear implant, or a spinal fluid leak?  Are you on long-term aspirin therapy?   No   Do you have cancer, leukemia, HIV/AIDS, or any other immune system problem?   No   Do you have a parent, brother, or sister with an immune system problem?   No   In the past 3 months, have you taken medications that affect  your immune system, such as prednisone, other steroids, or anticancer drugs; drugs for the treatment of rheumatoid arthritis, Crohn s disease, or psoriasis; or have you had radiation treatments?   No   Have you had a seizure, or a brain or other nervous system problem?   No   During the past year, have you received a transfusion of blood or blood    products, or been given immune (gamma) globulin or antiviral drug?   No   For women: Are you pregnant or is there a chance you could become       pregnant during the next month?   No   Have you received any vaccinations in the past 4 weeks?   No     Immunization questionnaire answers were all negative.        Per orders of Dr. Beatty, injection of HPV given by Pati Ceballos MA. Patient instructed to remain in clinic for 15 minutes afterwards, and to report any adverse reaction to me immediately.       Screening performed by Pati Ceballos MA on 10/9/2020 at 3:16 PM.

## 2020-11-18 ENCOUNTER — MYC MEDICAL ADVICE (OUTPATIENT)
Dept: FAMILY MEDICINE | Facility: CLINIC | Age: 40
End: 2020-11-18

## 2020-11-30 ENCOUNTER — OFFICE VISIT (OUTPATIENT)
Dept: FAMILY MEDICINE | Facility: CLINIC | Age: 40
End: 2020-11-30
Payer: COMMERCIAL

## 2020-11-30 VITALS
OXYGEN SATURATION: 98 % | BODY MASS INDEX: 25.79 KG/M2 | DIASTOLIC BLOOD PRESSURE: 72 MMHG | HEART RATE: 98 BPM | RESPIRATION RATE: 16 BRPM | TEMPERATURE: 98.6 F | HEIGHT: 67 IN | WEIGHT: 164.3 LBS | SYSTOLIC BLOOD PRESSURE: 136 MMHG

## 2020-11-30 DIAGNOSIS — R03.0 ELEVATED BLOOD PRESSURE READING WITHOUT DIAGNOSIS OF HYPERTENSION: Primary | ICD-10-CM

## 2020-11-30 DIAGNOSIS — Z23 NEED FOR VACCINATION: ICD-10-CM

## 2020-11-30 PROCEDURE — 90651 9VHPV VACCINE 2/3 DOSE IM: CPT | Performed by: FAMILY MEDICINE

## 2020-11-30 PROCEDURE — 90471 IMMUNIZATION ADMIN: CPT | Performed by: FAMILY MEDICINE

## 2020-11-30 PROCEDURE — 99213 OFFICE O/P EST LOW 20 MIN: CPT | Mod: 25 | Performed by: FAMILY MEDICINE

## 2020-11-30 RX ORDER — OMEPRAZOLE 20 MG/1
20 TABLET, DELAYED RELEASE ORAL DAILY
COMMUNITY
End: 2021-01-07

## 2020-11-30 RX ORDER — ASPIRIN 81 MG/1
81 TABLET ORAL DAILY
COMMUNITY

## 2020-11-30 RX ORDER — MULTIPLE VITAMINS W/ MINERALS TAB 9MG-400MCG
1 TAB ORAL DAILY
COMMUNITY

## 2020-11-30 ASSESSMENT — PAIN SCALES - GENERAL: PAINLEVEL: NO PAIN (0)

## 2020-11-30 ASSESSMENT — MIFFLIN-ST. JEOR: SCORE: 1610.95

## 2020-11-30 NOTE — PROGRESS NOTES
"Subjective     Catrachito Sawant is a 40 year old male who presents to clinic today for the following health issues:    HPI         Consult - hypertension  Onset: around November 1, 2020  Description: patient had high blood pressure at 2 different occasions. Both times was regarding health and life insurance.      SUBJECTIVE:  Catrachito  is a 40 year old male who presents for: Concerns about his blood pressure.  He had some come out to his home for life insurance policy and he had elevated blood pressure.  He is wife is a nurse and is take his blood pressure and has found it to be slightly elevated.  He has never had elevated blood pressure here.  He does admit to drinking a fair share of caffeine in the form of coffee.    No past medical history on file.  No past surgical history on file.  Social History     Tobacco Use     Smoking status: Former Smoker     Start date: 1/1/2009     Smokeless tobacco: Never Used   Substance Use Topics     Alcohol use: No     Current Outpatient Medications   Medication Sig Dispense Refill     aspirin 81 MG EC tablet Take 81 mg by mouth daily       multivitamin w/minerals (MULTI-VITAMIN) tablet Take 1 tablet by mouth daily       omeprazole (PRILOSEC OTC) 20 MG EC tablet Take 20 mg by mouth daily         REVIEW OF SYSTEMS:   5 point ROS negative except as noted above in HPI, including Gen., Resp, CV, GI &  system review.     OBJECTIVE:  Vitals: /72   Pulse 98   Temp 98.6  F (37  C) (Temporal)   Resp 16   Ht 1.689 m (5' 6.5\")   Wt 74.5 kg (164 lb 4.8 oz)   SpO2 98%   BMI 26.12 kg/m    BMI= Body mass index is 26.12 kg/m .  He is alert and appears well.  HEENT is clear.  Heart with a regular rhythm rate was about 74.  Lungs are clear.  Extremities normal.  Skin clear.    ASSESSMENT:  Elevated blood pressure without diagnosis of hypertension    PLAN:  He is going to make several different trips in here to have his blood pressure taken by a float nurse.  Printed out his vitals from " the last several years that he has been here and he is always had good blood pressure.  Today it is fine.  Discussed decreasing caffeine and salt in his diet.        Ananth Juares MD  [facility]

## 2020-11-30 NOTE — NURSING NOTE
Health Maintenance Due   Topic Date Due     PREVENTIVE CARE VISIT  1980     HIV SCREENING  02/21/1996     HEPATITIS C SCREENING  02/21/1999     LIPID  02/21/2016     Nancy Garcia, CMA

## 2020-12-09 ENCOUNTER — ALLIED HEALTH/NURSE VISIT (OUTPATIENT)
Dept: FAMILY MEDICINE | Facility: CLINIC | Age: 40
End: 2020-12-09
Payer: COMMERCIAL

## 2020-12-09 VITALS — SYSTOLIC BLOOD PRESSURE: 134 MMHG | DIASTOLIC BLOOD PRESSURE: 86 MMHG

## 2020-12-09 DIAGNOSIS — R03.0 ELEVATED BLOOD PRESSURE READING WITHOUT DIAGNOSIS OF HYPERTENSION: Primary | ICD-10-CM

## 2020-12-09 PROCEDURE — 99207 PR NO CHARGE NURSE ONLY: CPT

## 2020-12-09 NOTE — PROGRESS NOTES
Catrachito Sawant is a 40 year old patient who comes in today for a Blood Pressure check.  Initial BP:  /86      Data Unavailable  Disposition: follow-up as previously indicated by provider

## 2020-12-17 ENCOUNTER — ALLIED HEALTH/NURSE VISIT (OUTPATIENT)
Dept: FAMILY MEDICINE | Facility: CLINIC | Age: 40
End: 2020-12-17
Payer: COMMERCIAL

## 2020-12-17 VITALS — SYSTOLIC BLOOD PRESSURE: 132 MMHG | DIASTOLIC BLOOD PRESSURE: 92 MMHG | HEART RATE: 72 BPM

## 2020-12-17 DIAGNOSIS — Z01.30 BP CHECK: Primary | ICD-10-CM

## 2020-12-17 PROCEDURE — 99207 PR NO CHARGE NURSE ONLY: CPT

## 2020-12-23 ENCOUNTER — ALLIED HEALTH/NURSE VISIT (OUTPATIENT)
Dept: FAMILY MEDICINE | Facility: CLINIC | Age: 40
End: 2020-12-23
Payer: COMMERCIAL

## 2020-12-23 ENCOUNTER — MYC MEDICAL ADVICE (OUTPATIENT)
Dept: FAMILY MEDICINE | Facility: CLINIC | Age: 40
End: 2020-12-23

## 2020-12-23 VITALS — DIASTOLIC BLOOD PRESSURE: 102 MMHG | SYSTOLIC BLOOD PRESSURE: 128 MMHG | HEART RATE: 84 BPM

## 2020-12-23 DIAGNOSIS — I10 BENIGN ESSENTIAL HYPERTENSION: Primary | ICD-10-CM

## 2020-12-23 DIAGNOSIS — Z13.220 LIPID SCREENING: ICD-10-CM

## 2020-12-23 DIAGNOSIS — R03.0 ELEVATED BLOOD PRESSURE READING WITHOUT DIAGNOSIS OF HYPERTENSION: Primary | ICD-10-CM

## 2020-12-23 PROCEDURE — 99207 PR NO CHARGE NURSE ONLY: CPT

## 2020-12-23 NOTE — PROGRESS NOTES
Catrachito Sawant is a 40 year old patient who comes in today for a Blood Pressure check.  Initial BP:  BP (!) 128/102   Pulse 84      84  Disposition: follow-up as previously indicated by provider    Catrachito has been coming to clinic for BP checks for the last 3 weeks. He continues to have abnormal readings with no symptoms. Today I had patient sit for about 10 minutes before first BP reading. It was 132/98.  Then we talked about his family hx of heart disease and diabetes and about his episode of syncope a while back. He says that some times (about 3 times a month) he will have slight chest discomfort while driving to work. It doesn't last long so patient has not mentioned this before. He had a normal Echo in 2017.   He will be seeing Dr. Tavera in January for a physical and will discuss these concerns further at that time. He will need fasting lab work as well.   FYI for Dr. Juares, patient had no symptoms of distress today.  Claudia Kasper, CMA

## 2020-12-24 RX ORDER — LISINOPRIL 10 MG/1
10 TABLET ORAL DAILY
Qty: 90 TABLET | Refills: 3 | Status: SHIPPED | OUTPATIENT
Start: 2020-12-24 | End: 2021-03-15

## 2020-12-24 NOTE — TELEPHONE ENCOUNTER
We will start the patient on lisinopril 10 mg daily.  I ordered fasting labs for him to be drawn prior to his appointment.    Electronically signed by:  Jesse Tavera M.D.  12/24/2020

## 2020-12-31 DIAGNOSIS — Z13.220 LIPID SCREENING: ICD-10-CM

## 2020-12-31 DIAGNOSIS — I10 BENIGN ESSENTIAL HYPERTENSION: ICD-10-CM

## 2020-12-31 LAB
ANION GAP SERPL CALCULATED.3IONS-SCNC: 3 MMOL/L (ref 3–14)
BUN SERPL-MCNC: 14 MG/DL (ref 7–30)
CALCIUM SERPL-MCNC: 8.7 MG/DL (ref 8.5–10.1)
CHLORIDE SERPL-SCNC: 105 MMOL/L (ref 94–109)
CHOLEST SERPL-MCNC: 200 MG/DL
CO2 SERPL-SCNC: 30 MMOL/L (ref 20–32)
CREAT SERPL-MCNC: 0.65 MG/DL (ref 0.66–1.25)
CREAT UR-MCNC: 121 MG/DL
GFR SERPL CREATININE-BSD FRML MDRD: >90 ML/MIN/{1.73_M2}
GLUCOSE SERPL-MCNC: 106 MG/DL (ref 70–99)
HDLC SERPL-MCNC: 49 MG/DL
LDLC SERPL CALC-MCNC: 127 MG/DL
MICROALBUMIN UR-MCNC: 5 MG/L
MICROALBUMIN/CREAT UR: 4.17 MG/G CR (ref 0–17)
NONHDLC SERPL-MCNC: 151 MG/DL
POTASSIUM SERPL-SCNC: 4.2 MMOL/L (ref 3.4–5.3)
SODIUM SERPL-SCNC: 138 MMOL/L (ref 133–144)
TRIGL SERPL-MCNC: 119 MG/DL
TSH SERPL DL<=0.005 MIU/L-ACNC: 1.06 MU/L (ref 0.4–4)

## 2020-12-31 PROCEDURE — 84443 ASSAY THYROID STIM HORMONE: CPT | Performed by: FAMILY MEDICINE

## 2020-12-31 PROCEDURE — 80048 BASIC METABOLIC PNL TOTAL CA: CPT | Performed by: FAMILY MEDICINE

## 2020-12-31 PROCEDURE — 36415 COLL VENOUS BLD VENIPUNCTURE: CPT | Performed by: FAMILY MEDICINE

## 2020-12-31 PROCEDURE — 82043 UR ALBUMIN QUANTITATIVE: CPT | Performed by: FAMILY MEDICINE

## 2020-12-31 PROCEDURE — 80061 LIPID PANEL: CPT | Performed by: FAMILY MEDICINE

## 2021-01-07 ENCOUNTER — OFFICE VISIT (OUTPATIENT)
Dept: FAMILY MEDICINE | Facility: CLINIC | Age: 41
End: 2021-01-07
Payer: COMMERCIAL

## 2021-01-07 VITALS
WEIGHT: 162 LBS | TEMPERATURE: 99 F | HEART RATE: 95 BPM | BODY MASS INDEX: 25.76 KG/M2 | DIASTOLIC BLOOD PRESSURE: 94 MMHG | RESPIRATION RATE: 16 BRPM | SYSTOLIC BLOOD PRESSURE: 130 MMHG | OXYGEN SATURATION: 99 %

## 2021-01-07 DIAGNOSIS — Z00.00 ENCOUNTER FOR ROUTINE ADULT HEALTH EXAMINATION WITHOUT ABNORMAL FINDINGS: Primary | ICD-10-CM

## 2021-01-07 DIAGNOSIS — K21.9 GASTROESOPHAGEAL REFLUX DISEASE WITHOUT ESOPHAGITIS: ICD-10-CM

## 2021-01-07 DIAGNOSIS — G47.09 OTHER INSOMNIA: ICD-10-CM

## 2021-01-07 DIAGNOSIS — I10 BENIGN ESSENTIAL HYPERTENSION: ICD-10-CM

## 2021-01-07 PROCEDURE — 99214 OFFICE O/P EST MOD 30 MIN: CPT | Mod: 25 | Performed by: FAMILY MEDICINE

## 2021-01-07 PROCEDURE — 99395 PREV VISIT EST AGE 18-39: CPT | Performed by: FAMILY MEDICINE

## 2021-01-07 RX ORDER — OMEPRAZOLE 20 MG/1
20 TABLET, DELAYED RELEASE ORAL DAILY
Qty: 90 TABLET | Refills: 3 | Status: SHIPPED | OUTPATIENT
Start: 2021-01-07 | End: 2021-01-08

## 2021-01-07 RX ORDER — HYDROXYZINE PAMOATE 25 MG/1
25-50 CAPSULE ORAL
Qty: 60 CAPSULE | Refills: 3 | Status: SHIPPED | OUTPATIENT
Start: 2021-01-07

## 2021-01-07 RX ORDER — HYDROCHLOROTHIAZIDE 12.5 MG/1
12.5 TABLET ORAL DAILY
Qty: 90 TABLET | Refills: 3 | Status: SHIPPED | OUTPATIENT
Start: 2021-01-07 | End: 2022-01-03

## 2021-01-07 SDOH — SOCIAL STABILITY: SOCIAL INSECURITY
WITHIN THE LAST YEAR, HAVE YOU BEEN KICKED, HIT, SLAPPED, OR OTHERWISE PHYSICALLY HURT BY YOUR PARTNER OR EX-PARTNER?: NO

## 2021-01-07 SDOH — HEALTH STABILITY: MENTAL HEALTH
STRESS IS WHEN SOMEONE FEELS TENSE, NERVOUS, ANXIOUS, OR CAN'T SLEEP AT NIGHT BECAUSE THEIR MIND IS TROUBLED. HOW STRESSED ARE YOU?: TO SOME EXTENT

## 2021-01-07 SDOH — SOCIAL STABILITY: SOCIAL INSECURITY: WITHIN THE LAST YEAR, HAVE YOU BEEN HUMILIATED OR EMOTIONALLY ABUSED IN OTHER WAYS BY YOUR PARTNER OR EX-PARTNER?: NO

## 2021-01-07 SDOH — ECONOMIC STABILITY: FOOD INSECURITY: WITHIN THE PAST 12 MONTHS, YOU WORRIED THAT YOUR FOOD WOULD RUN OUT BEFORE YOU GOT MONEY TO BUY MORE.: NEVER TRUE

## 2021-01-07 SDOH — SOCIAL STABILITY: SOCIAL INSECURITY
WITHIN THE LAST YEAR, HAVE TO BEEN RAPED OR FORCED TO HAVE ANY KIND OF SEXUAL ACTIVITY BY YOUR PARTNER OR EX-PARTNER?: NO

## 2021-01-07 SDOH — SOCIAL STABILITY: SOCIAL NETWORK: HOW OFTEN DO YOU GET TOGETHER WITH FRIENDS OR RELATIVES?: ONCE A WEEK

## 2021-01-07 SDOH — ECONOMIC STABILITY: INCOME INSECURITY: HOW HARD IS IT FOR YOU TO PAY FOR THE VERY BASICS LIKE FOOD, HOUSING, MEDICAL CARE, AND HEATING?: NOT HARD AT ALL

## 2021-01-07 SDOH — HEALTH STABILITY: MENTAL HEALTH: HOW OFTEN DO YOU HAVE A DRINK CONTAINING ALCOHOL?: NEVER

## 2021-01-07 SDOH — HEALTH STABILITY: MENTAL HEALTH: HOW OFTEN DO YOU HAVE 6 OR MORE DRINKS ON ONE OCCASION?: NOT ASKED

## 2021-01-07 SDOH — ECONOMIC STABILITY: TRANSPORTATION INSECURITY
IN THE PAST 12 MONTHS, HAS THE LACK OF TRANSPORTATION KEPT YOU FROM MEDICAL APPOINTMENTS OR FROM GETTING MEDICATIONS?: NO

## 2021-01-07 SDOH — HEALTH STABILITY: PHYSICAL HEALTH: ON AVERAGE, HOW MANY DAYS PER WEEK DO YOU ENGAGE IN MODERATE TO STRENUOUS EXERCISE (LIKE A BRISK WALK)?: 0 DAYS

## 2021-01-07 SDOH — ECONOMIC STABILITY: TRANSPORTATION INSECURITY
IN THE PAST 12 MONTHS, HAS LACK OF TRANSPORTATION KEPT YOU FROM MEETINGS, WORK, OR FROM GETTING THINGS NEEDED FOR DAILY LIVING?: NO

## 2021-01-07 SDOH — SOCIAL STABILITY: SOCIAL NETWORK: HOW OFTEN DO YOU ATTENT MEETINGS OF THE CLUB OR ORGANIZATION YOU BELONG TO?: NEVER

## 2021-01-07 SDOH — ECONOMIC STABILITY: FOOD INSECURITY: WITHIN THE PAST 12 MONTHS, THE FOOD YOU BOUGHT JUST DIDN'T LAST AND YOU DIDN'T HAVE MONEY TO GET MORE.: NEVER TRUE

## 2021-01-07 SDOH — HEALTH STABILITY: MENTAL HEALTH: HOW MANY STANDARD DRINKS CONTAINING ALCOHOL DO YOU HAVE ON A TYPICAL DAY?: NOT ASKED

## 2021-01-07 SDOH — SOCIAL STABILITY: SOCIAL NETWORK: ARE YOU MARRIED, WIDOWED, DIVORCED, SEPARATED, NEVER MARRIED, OR LIVING WITH A PARTNER?: MARRIED

## 2021-01-07 SDOH — SOCIAL STABILITY: SOCIAL NETWORK: HOW OFTEN DO YOU ATTEND CHURCH OR RELIGIOUS SERVICES?: NEVER

## 2021-01-07 SDOH — SOCIAL STABILITY: SOCIAL NETWORK
IN A TYPICAL WEEK, HOW MANY TIMES DO YOU TALK ON THE PHONE WITH FAMILY, FRIENDS, OR NEIGHBORS?: MORE THAN THREE TIMES A WEEK

## 2021-01-07 SDOH — SOCIAL STABILITY: SOCIAL INSECURITY: WITHIN THE LAST YEAR, HAVE YOU BEEN AFRAID OF YOUR PARTNER OR EX-PARTNER?: NO

## 2021-01-07 SDOH — SOCIAL STABILITY: SOCIAL NETWORK
DO YOU BELONG TO ANY CLUBS OR ORGANIZATIONS SUCH AS CHURCH GROUPS UNIONS, FRATERNAL OR ATHLETIC GROUPS, OR SCHOOL GROUPS?: NOT ASKED

## 2021-01-07 SDOH — HEALTH STABILITY: PHYSICAL HEALTH: ON AVERAGE, HOW MANY MINUTES DO YOU ENGAGE IN EXERCISE AT THIS LEVEL?: NOT ASKED

## 2021-01-07 ASSESSMENT — ENCOUNTER SYMPTOMS
ABDOMINAL PAIN: 0
PARESTHESIAS: 0
JOINT SWELLING: 0
DYSURIA: 0
NAUSEA: 0
HEADACHES: 0
CHILLS: 0
WEAKNESS: 0
EYE PAIN: 0
ARTHRALGIAS: 0
DIARRHEA: 0
CONSTIPATION: 0
HEMATURIA: 0
DIZZINESS: 0
SORE THROAT: 0
NERVOUS/ANXIOUS: 0
FEVER: 0
HEMATOCHEZIA: 0
SHORTNESS OF BREATH: 0
COUGH: 0
HEARTBURN: 1
PALPITATIONS: 0
FREQUENCY: 0
MYALGIAS: 0

## 2021-01-07 ASSESSMENT — PAIN SCALES - GENERAL: PAINLEVEL: NO PAIN (0)

## 2021-01-07 ASSESSMENT — PATIENT HEALTH QUESTIONNAIRE - PHQ9
10. IF YOU CHECKED OFF ANY PROBLEMS, HOW DIFFICULT HAVE THESE PROBLEMS MADE IT FOR YOU TO DO YOUR WORK, TAKE CARE OF THINGS AT HOME, OR GET ALONG WITH OTHER PEOPLE: NOT DIFFICULT AT ALL
SUM OF ALL RESPONSES TO PHQ QUESTIONS 1-9: 7
SUM OF ALL RESPONSES TO PHQ QUESTIONS 1-9: 7

## 2021-01-07 NOTE — PROGRESS NOTES
SUBJECTIVE:   CC: Catrachito Sawant is an 40 year old male who presents for preventative health visit.     Patient has been advised of split billing requirements and indicates understanding: Yes  Healthy Habits:     Getting at least 3 servings of Calcium per day:  NO    Bi-annual eye exam:  NO    Dental care twice a year:  Yes    Sleep apnea or symptoms of sleep apnea:  None    Diet:  Regular (no restrictions)    Frequency of exercise:  None    Taking medications regularly:  Yes    Medication side effects:  None    PHQ-2 Total Score: 3    Additional concerns today:  Yes    PROBLEMS TO ADD ON...  He has noticed more lack of motivation and some distractibility at work and home.  When he was in high school he did okay but it was a struggle at times but was never tested for ADHD.  He tends to be a little fidgety at times but was never bouncing off the barrientos when he was a kid.  I talked to him about sex drive and sexual function and that has not changed at all.  He still gets erections spontaneously at night and has no issues with maintaining erections during intercourse with his wife.  There is a family history of hypertension and diabetes that is quite extensive, we recently did his labs and his glucose was 106.  His blood pressure is still not well controlled on the 10 mg of lisinopril so we are adding hydrochlorothiazide to his daily regimen.  We did talk about doing some neuropsychologic testing for adult ADHD.  He said he would like to just wait on this for a while and see how this progresses or if it changes at all.    Today's PHQ-2 Score:   PHQ-2 ( 1999 [employer]) 1/7/2021   Q1: Little interest or pleasure in doing things 3   Q2: Feeling down, depressed or hopeless 0   PHQ-2 Score 3   Q1: Little interest or pleasure in doing things Nearly every day   Q2: Feeling down, depressed or hopeless Not at all   PHQ-2 Score 3       Abuse: Current or Past(Physical, Sexual or Emotional)- No  Do you feel safe in your environment?  Yes        Social History     Tobacco Use     Smoking status: Former Smoker     Start date: 1/1/2009     Smokeless tobacco: Never Used   Substance Use Topics     Alcohol use: No     Frequency: Never     Comment: drank heavily when younger     If you drink alcohol do you typically have >3 drinks per day or >7 drinks per week? No    Alcohol Use 1/7/2021   Prescreen: >3 drinks/day or >7 drinks/week? Not Applicable   Prescreen: >3 drinks/day or >7 drinks/week? -   No flowsheet data found.    Last PSA: No results found for: PSA    Reviewed orders with patient. Reviewed health maintenance and updated orders accordingly - Yes  Labs reviewed in EPIC  BP Readings from Last 3 Encounters:   01/07/21 (!) 130/94   12/23/20 (!) 128/102   12/17/20 (!) 132/92    Wt Readings from Last 3 Encounters:   01/07/21 73.5 kg (162 lb)   11/30/20 74.5 kg (164 lb 4.8 oz)   12/12/19 72.6 kg (160 lb)                  Patient Active Problem List   Diagnosis     Benign essential hypertension     Gastroesophageal reflux disease without esophagitis     Other insomnia     Past Surgical History:   Procedure Laterality Date     OPEN REDUCTION INTERNAL FIXATION HAND Left 2003       Social History     Tobacco Use     Smoking status: Former Smoker     Start date: 1/1/2009     Smokeless tobacco: Never Used   Substance Use Topics     Alcohol use: No     Frequency: Never     Comment: drank heavily when younger     Family History   Problem Relation Age of Onset     Diabetes Mother         1950     Hypertension Mother      Diabetes Brother         8 yrs older     Hypertension Brother      Arthritis Father      Chronic Obstructive Pulmonary Disease Father 70        1946     Other - See Comments Sister         7 yrs older     Diabetes Type 2  Brother         5 yrs younger     Hypertension Brother      Other - See Comments Son         2017     Other - See Comments Daughter         2010     Coronary Artery Disease No family hx of          Current Outpatient  Medications   Medication Sig Dispense Refill     aspirin 81 MG EC tablet Take 81 mg by mouth daily       hydrochlorothiazide (HYDRODIURIL) 12.5 MG tablet Take 1 tablet (12.5 mg) by mouth daily 90 tablet 3     hydrOXYzine (VISTARIL) 25 MG capsule Take 1-2 capsules (25-50 mg) by mouth nightly as needed (insomnia) 60 capsule 3     lisinopril (ZESTRIL) 10 MG tablet Take 1 tablet (10 mg) by mouth daily 90 tablet 3     multivitamin w/minerals (MULTI-VITAMIN) tablet Take 1 tablet by mouth daily       omeprazole (PRILOSEC OTC) 20 MG EC tablet Take 1 tablet (20 mg) by mouth daily 90 tablet 3     No Known Allergies  Recent Labs   Lab Test 12/31/20  0915 05/04/17  1004   *  --    HDL 49  --    TRIG 119  --    ALT  --  58   CR 0.65* 0.64*   GFRESTIMATED >90 >90  Non African American GFR Calc     GFRESTBLACK >90 >90  African American GFR Calc     POTASSIUM 4.2 4.1   TSH 1.06 1.98        Reviewed and updated as needed this visit by clinical staff  Tobacco  Allergies  Meds  Problems  Med Hx  Surg Hx  Fam Hx          Reviewed and updated as needed this visit by Provider  Tobacco  Allergies  Meds  Problems  Med Hx  Surg Hx  Fam Hx         Past Medical History:   Diagnosis Date     Benign essential hypertension       Past Surgical History:   Procedure Laterality Date     OPEN REDUCTION INTERNAL FIXATION HAND Left 2003       Review of Systems   Constitutional: Negative for chills and fever.   HENT: Negative for congestion, ear pain, hearing loss and sore throat.    Eyes: Negative for pain and visual disturbance.   Respiratory: Negative for cough and shortness of breath.    Cardiovascular: Negative for chest pain, palpitations and peripheral edema.   Gastrointestinal: Positive for heartburn. Negative for abdominal pain, constipation, diarrhea, hematochezia and nausea.   Genitourinary: Negative for discharge, dysuria, frequency, genital sores, hematuria, impotence and urgency.   Musculoskeletal: Negative for  arthralgias, joint swelling and myalgias.   Skin: Negative for rash.   Neurological: Negative for dizziness, weakness, headaches and paresthesias.   Psychiatric/Behavioral: Negative for mood changes. The patient is not nervous/anxious.      CONSTITUTIONAL: NEGATIVE for fever, chills, change in weight  INTEGUMENTARY/SKIN: NEGATIVE for worrisome rashes, moles or lesions  EYES: NEGATIVE for vision changes or irritation  ENT: NEGATIVE for ear, mouth and throat problems  RESP: NEGATIVE for significant cough or SOB  CV: NEGATIVE for chest pain, palpitations or peripheral edema  GI: NEGATIVE for nausea, abdominal pain, heartburn, or change in bowel habits   male: negative for dysuria, hematuria, decreased urinary stream, erectile dysfunction, urethral discharge  MUSCULOSKELETAL: NEGATIVE for significant arthralgias or myalgia  NEURO: NEGATIVE for weakness, dizziness or paresthesias  PSYCHIATRIC: NEGATIVE for changes in mood or affect    OBJECTIVE:   BP (!) 130/94   Pulse 95   Temp 99  F (37.2  C) (Temporal)   Resp 16   Wt 73.5 kg (162 lb)   SpO2 99%   BMI 25.76 kg/m      Physical Exam  GENERAL: healthy, alert and no distress  EYES: Eyes grossly normal to inspection, PERRL and conjunctivae and sclerae normal  HENT: ear canals and TM's normal, nose and mouth without ulcers or lesions  NECK: no adenopathy, no asymmetry, masses, or scars and thyroid normal to palpation  RESP: lungs clear to auscultation - no rales, rhonchi or wheezes  CV: regular rate and rhythm, normal S1 S2, no S3 or S4, no murmur, click or rub, no peripheral edema and peripheral pulses strong  ABDOMEN: soft, nontender, no hepatosplenomegaly, no masses and bowel sounds normal   (male): testicles normal without atrophy or masses, no hernias and penis normal without urethral discharge  MS: no gross musculoskeletal defects noted, no edema  SKIN: no suspicious lesions or rashes  NEURO: Normal strength and tone, mentation intact and speech  "normal  PSYCH: mentation appears normal, affect normal/bright    Diagnostic Test Results:  Labs reviewed in Epic    ASSESSMENT/PLAN:   (Z00.00) Encounter for routine adult health examination without abnormal findings  Comment: Adult physical with a history of hypertension and gastroesophageal reflux..    Plan: Please see below.    (I10) Benign essential hypertension  Comment: Essential hypertension not well controlled on his current dose of lisinopril.    Plan: hydrochlorothiazide (HYDRODIURIL) 12.5 MG         tablet        We will add hydrochlorothiazide 12.5 mg daily and have him come back for a recheck of his pressures in 2 months.    (K21.9) Gastroesophageal reflux disease without esophagitis  Comment: He has been using OTC omeprazole for his reflux.  When he does not use his medication he does have issues.  Plan: omeprazole (PRILOSEC OTC) 20 MG EC tablet        He is requesting a written prescription to go to the pharmacy so he does not have to buy it over-the-counter.  I sent a prescription for him.    (G47.09) Other insomnia  Comment: He has not been sleeping well and sometimes is only getting 2 or 3 hours of sleep at night.  Plan: hydrOXYzine (VISTARIL) 25 MG capsule        We talked about using sleep aids and will try hydroxyzine 25 mg.  He can take 1 to 2 capsules 20 to 30 minutes prior to bedtime.  I told him to use this regularly for the next 2 weeks to get him into a better sleep pattern, sleep hygiene improvement.  He can then use it as needed after that.     Patient has been advised of split billing requirements and indicates understanding: Yes  COUNSELING:   Reviewed preventive health counseling, as reflected in patient instructions       Regular exercise       Healthy diet/nutrition       Alcohol Use     Estimated body mass index is 25.76 kg/m  as calculated from the following:    Height as of 11/30/20: 1.689 m (5' 6.5\").    Weight as of this encounter: 73.5 kg (162 lb).     Weight management plan: " He is at the high end range of his normal BMI.  He has gained some weight over the past few years due to his eating habits.  We did discuss portion control and limiting the excessive carbs that he has been taking in.  We also discussed other strategies for minimizing caloric intake and increasing his weight loss.  He definitely is going to go back to the gym and has been starting to get on the treadmill since the first of the year.    He reports that he has quit smoking. He started smoking about 12 years ago. He has never used smokeless tobacco.      Counseling Resources:  ATP IV Guidelines  Pooled Cohorts Equation Calculator  FRAX Risk Assessment  ICSI Preventive Guidelines  Dietary Guidelines for Americans, 2010  Tuee's MyPlate  ASA Prophylaxis  Lung CA Screening      Answers for HPI/ROS submitted by the patient on 1/7/2021   Annual Exam:  If you checked off any problems, how difficult have these problems made it for you to do your work, take care of things at home, or get along with other people?: Not difficult at all  PHQ9 TOTAL SCORE: 7    Electronically signed by:  Jesse Tavera M.D.  1/7/2021

## 2021-01-08 ASSESSMENT — PATIENT HEALTH QUESTIONNAIRE - PHQ9: SUM OF ALL RESPONSES TO PHQ QUESTIONS 1-9: 7

## 2021-03-15 ENCOUNTER — ALLIED HEALTH/NURSE VISIT (OUTPATIENT)
Dept: FAMILY MEDICINE | Facility: CLINIC | Age: 41
End: 2021-03-15
Payer: COMMERCIAL

## 2021-03-15 VITALS — HEART RATE: 84 BPM | DIASTOLIC BLOOD PRESSURE: 90 MMHG | SYSTOLIC BLOOD PRESSURE: 118 MMHG

## 2021-03-15 DIAGNOSIS — Z01.30 BP CHECK: Primary | ICD-10-CM

## 2021-03-15 DIAGNOSIS — I10 BENIGN ESSENTIAL HYPERTENSION: ICD-10-CM

## 2021-03-15 PROCEDURE — 99207 PR NO CHARGE NURSE ONLY: CPT

## 2021-03-15 RX ORDER — LISINOPRIL 20 MG/1
20 TABLET ORAL DAILY
OUTPATIENT
Start: 2021-03-15

## 2021-03-15 RX ORDER — LISINOPRIL 20 MG/1
20 TABLET ORAL DAILY
Qty: 90 TABLET | Refills: 3 | Status: SHIPPED | OUTPATIENT
Start: 2021-03-15 | End: 2022-03-08

## 2021-03-15 RX ORDER — LISINOPRIL 20 MG/1
20 TABLET ORAL DAILY
Status: CANCELLED | OUTPATIENT
Start: 2021-03-15

## 2021-03-15 NOTE — NURSING NOTE
Catrachito Sawant is a 41 year old patient who comes in today for a Blood Pressure check.  Initial BP:  BP (!) 118/90 (BP Location: Right arm, Patient Position: Chair, Cuff Size: Adult Regular)   Pulse 84      84  Disposition: follow-up as previously indicated by provider, provider notified while patient in the clinic and results routed to provider. Patient scheduled follow up in 3 months with Dr. Tavera as instructed. Per Faye patient notified that Lisinopril will be increased to 20mg. Routing refill encounter with medication pended.      Pati Ceballos MA 3/15/2021  4:24 PM

## 2021-03-15 NOTE — PROGRESS NOTES
Need to increase his lisinopril to 20 mg daily.  Recheck BP in 2-3 months in the office.     Electronically signed by:  Jesse Tavera M.D.  3/15/2021

## 2021-05-17 ENCOUNTER — ALLIED HEALTH/NURSE VISIT (OUTPATIENT)
Dept: FAMILY MEDICINE | Facility: CLINIC | Age: 41
End: 2021-05-17
Payer: COMMERCIAL

## 2021-05-17 DIAGNOSIS — Z23 NEED FOR VACCINATION: Primary | ICD-10-CM

## 2021-05-17 PROCEDURE — 90471 IMMUNIZATION ADMIN: CPT

## 2021-05-17 PROCEDURE — 99207 PR NO CHARGE NURSE ONLY: CPT

## 2021-05-17 PROCEDURE — 90651 9VHPV VACCINE 2/3 DOSE IM: CPT

## 2021-05-17 NOTE — PROGRESS NOTES
Prior to immunization administration, verified patients identity using patient s name and date of birth. Please see Immunization Activity for additional information.     Screening Questionnaire for Adult Immunization    Are you sick today?   No   Do you have allergies to medications, food, a vaccine component or latex?   No   Have you ever had a serious reaction after receiving a vaccination?   No   Do you have a long-term health problem with heart, lung, kidney, or metabolic disease (e.g., diabetes), asthma, a blood disorder, no spleen, complement component deficiency, a cochlear implant, or a spinal fluid leak?  Are you on long-term aspirin therapy?   No   Do you have cancer, leukemia, HIV/AIDS, or any other immune system problem?   No   Do you have a parent, brother, or sister with an immune system problem?   No   In the past 3 months, have you taken medications that affect  your immune system, such as prednisone, other steroids, or anticancer drugs; drugs for the treatment of rheumatoid arthritis, Crohn s disease, or psoriasis; or have you had radiation treatments?   No   Have you had a seizure, or a brain or other nervous system problem?   No   During the past year, have you received a transfusion of blood or blood    products, or been given immune (gamma) globulin or antiviral drug?   No   For women: Are you pregnant or is there a chance you could become       pregnant during the next month?   No   Have you received any vaccinations in the past 4 weeks?   No     Immunization questionnaire answers were all negative.        Per orders of Dr. Tavera, injection of HPV given by Rubi Ward CMA. Patient instructed to remain in clinic for 15 minutes afterwards, and to report any adverse reaction to me immediately.       Screening performed by Rubi Ward CMA on 5/17/2021 at 3:12 PM.

## 2021-06-22 ENCOUNTER — OFFICE VISIT (OUTPATIENT)
Dept: FAMILY MEDICINE | Facility: CLINIC | Age: 41
End: 2021-06-22
Payer: COMMERCIAL

## 2021-06-22 VITALS
HEART RATE: 99 BPM | WEIGHT: 158 LBS | DIASTOLIC BLOOD PRESSURE: 76 MMHG | OXYGEN SATURATION: 98 % | BODY MASS INDEX: 25.12 KG/M2 | TEMPERATURE: 98.7 F | SYSTOLIC BLOOD PRESSURE: 112 MMHG | RESPIRATION RATE: 18 BRPM

## 2021-06-22 DIAGNOSIS — I10 BENIGN ESSENTIAL HYPERTENSION: Primary | ICD-10-CM

## 2021-06-22 DIAGNOSIS — Z20.822 SUSPECTED COVID-19 VIRUS INFECTION: ICD-10-CM

## 2021-06-22 PROCEDURE — 99214 OFFICE O/P EST MOD 30 MIN: CPT | Performed by: FAMILY MEDICINE

## 2021-06-22 ASSESSMENT — PAIN SCALES - GENERAL: PAINLEVEL: NO PAIN (0)

## 2021-06-22 NOTE — PROGRESS NOTES
Assessment & Plan     (I10) Benign essential hypertension  (primary encounter diagnosis)  Comment: Stable on current medication regimen.  Plan: **Basic metabolic panel FUTURE anytime, Albumin        Random Urine Quantitative with Creat Ratio        No change, labs were drawn today.  Return in 1 year.    (Z20.822) Suspected COVID-19 virus infection  Comment: He thinks that he had COVID-19 earlier in the year and is holding off on the vaccine for now.  Plan: COVID-19 Cruz RBD Chanelle & Titer Reflex        He is agreeable to having his Covid antibodies drawn with his next blood draw.  I did recommend that he still get the vaccine and if he has had a novel infection and has his own antibodies the vaccine will give him a boost immune response and long-term immunity.        25 minutes spent on the date of the encounter doing chart review, history and exam, documentation and further activities per the note      Return in about 6 months (around 12/22/2021) for lab work.    Electronically signed by:  Jesse Tavera M.D.  6/23/2021    Chris Winston is a 41 year old who presents for the following health issues     HPI     Hypertension Follow-up      Do you check your blood pressure regularly outside of the clinic? No     Are you following a low salt diet? No    Are your blood pressures ever more than 140 on the top number (systolic) OR more   than 90 on the bottom number (diastolic), for example 140/90? No      How many servings of fruits and vegetables do you eat daily?  2-3    On average, how many sweetened beverages do you drink each day (Examples: soda, juice, sweet tea, etc.  Do NOT count diet or artificially sweetened beverages)?   1    How many days per week do you exercise enough to make your heart beat faster? 3 or less    How many minutes a day do you exercise enough to make your heart beat faster? 9 or less    How many days per week do you miss taking your medication? 0    Doing well, no concerns.  He  tolerates his medication well and takes both of them in the morning.  He has not been taking his blood pressure regularly.  Last time he checked it was about a month ago and it was in the 110s to 20s over 60s to 70s.  He is having no side effects from the medication and feels good.  He is not exercising on a regular basis.     Review of Systems   Constitutional, HEENT, cardiovascular, pulmonary, gi and gu systems are negative, except as otherwise noted.      Objective    /76   Pulse 99   Temp 98.7  F (37.1  C) (Temporal)   Resp 18   Wt 71.7 kg (158 lb)   SpO2 98%   BMI 25.12 kg/m    Body mass index is 25.12 kg/m .  Physical Exam   GENERAL: healthy, alert and no distress  NECK: no adenopathy, no asymmetry, masses, or scars and thyroid normal to palpation  RESP: lungs clear to auscultation - no rales, rhonchi or wheezes  CV: regular rate and rhythm, normal S1 S2, no S3 or S4, no murmur, click or rub, no peripheral edema and peripheral pulses strong  ABDOMEN: soft, nontender, no hepatosplenomegaly, no masses and bowel sounds normal  MS: no gross musculoskeletal defects noted, no edema    He will be due for his next labs in December of this year.

## 2021-10-03 ENCOUNTER — HEALTH MAINTENANCE LETTER (OUTPATIENT)
Age: 41
End: 2021-10-03

## 2022-01-03 DIAGNOSIS — I10 BENIGN ESSENTIAL HYPERTENSION: ICD-10-CM

## 2022-01-03 RX ORDER — HYDROCHLOROTHIAZIDE 12.5 MG/1
TABLET ORAL
Qty: 90 TABLET | Refills: 0 | Status: SHIPPED | OUTPATIENT
Start: 2022-01-03 | End: 2022-03-07

## 2022-01-03 NOTE — TELEPHONE ENCOUNTER
Hydrodiuril  Medication is being filled for 1 time refill only due to:  Patient needs to be seen because it has been more than one year since last visit.    Sending to scheduling for yearly office visit due    Susana Montero RN

## 2022-01-17 ENCOUNTER — LAB (OUTPATIENT)
Dept: LAB | Facility: CLINIC | Age: 42
End: 2022-01-17
Payer: COMMERCIAL

## 2022-01-17 DIAGNOSIS — Z20.822 SUSPECTED COVID-19 VIRUS INFECTION: ICD-10-CM

## 2022-01-17 DIAGNOSIS — I10 BENIGN ESSENTIAL HYPERTENSION: ICD-10-CM

## 2022-01-17 LAB
ANION GAP SERPL CALCULATED.3IONS-SCNC: 4 MMOL/L (ref 3–14)
BUN SERPL-MCNC: 14 MG/DL (ref 7–30)
CALCIUM SERPL-MCNC: 8.8 MG/DL (ref 8.5–10.1)
CHLORIDE BLD-SCNC: 103 MMOL/L (ref 94–109)
CO2 SERPL-SCNC: 30 MMOL/L (ref 20–32)
CREAT SERPL-MCNC: 0.71 MG/DL (ref 0.66–1.25)
CREAT UR-MCNC: 34 MG/DL
GFR SERPL CREATININE-BSD FRML MDRD: >90 ML/MIN/1.73M2
GLUCOSE BLD-MCNC: 101 MG/DL (ref 70–99)
MICROALBUMIN UR-MCNC: <5 MG/L
MICROALBUMIN/CREAT UR: NORMAL MG/G{CREAT}
POTASSIUM BLD-SCNC: 3.6 MMOL/L (ref 3.4–5.3)
SODIUM SERPL-SCNC: 137 MMOL/L (ref 133–144)

## 2022-01-17 PROCEDURE — 86769 SARS-COV-2 COVID-19 ANTIBODY: CPT | Mod: 90

## 2022-01-17 PROCEDURE — 80048 BASIC METABOLIC PNL TOTAL CA: CPT

## 2022-01-17 PROCEDURE — 36415 COLL VENOUS BLD VENIPUNCTURE: CPT

## 2022-01-17 PROCEDURE — 99000 SPECIMEN HANDLING OFFICE-LAB: CPT

## 2022-01-17 PROCEDURE — 82043 UR ALBUMIN QUANTITATIVE: CPT

## 2022-01-18 LAB
SARS-COV-2 AB SERPL IA-ACNC: 195 U/ML
SARS-COV-2 AB SERPL QL IA: POSITIVE

## 2022-03-07 DIAGNOSIS — K21.00 GASTROESOPHAGEAL REFLUX DISEASE WITH ESOPHAGITIS WITHOUT HEMORRHAGE: ICD-10-CM

## 2022-03-07 DIAGNOSIS — I10 BENIGN ESSENTIAL HYPERTENSION: ICD-10-CM

## 2022-03-08 RX ORDER — HYDROCHLOROTHIAZIDE 12.5 MG/1
12.5 TABLET ORAL DAILY
Qty: 90 TABLET | Refills: 0 | Status: SHIPPED | OUTPATIENT
Start: 2022-03-08 | End: 2022-03-14

## 2022-03-08 RX ORDER — LISINOPRIL 20 MG/1
TABLET ORAL
Qty: 90 TABLET | Refills: 0 | Status: SHIPPED | OUTPATIENT
Start: 2022-03-08 | End: 2022-03-14

## 2022-03-14 ENCOUNTER — OFFICE VISIT (OUTPATIENT)
Dept: FAMILY MEDICINE | Facility: CLINIC | Age: 42
End: 2022-03-14
Payer: COMMERCIAL

## 2022-03-14 VITALS
TEMPERATURE: 97.7 F | DIASTOLIC BLOOD PRESSURE: 86 MMHG | WEIGHT: 166 LBS | OXYGEN SATURATION: 98 % | BODY MASS INDEX: 26.06 KG/M2 | RESPIRATION RATE: 16 BRPM | HEIGHT: 67 IN | SYSTOLIC BLOOD PRESSURE: 118 MMHG | HEART RATE: 96 BPM

## 2022-03-14 DIAGNOSIS — I10 BENIGN ESSENTIAL HYPERTENSION: Primary | ICD-10-CM

## 2022-03-14 DIAGNOSIS — K21.00 GASTROESOPHAGEAL REFLUX DISEASE WITH ESOPHAGITIS WITHOUT HEMORRHAGE: ICD-10-CM

## 2022-03-14 PROCEDURE — 99214 OFFICE O/P EST MOD 30 MIN: CPT | Performed by: FAMILY MEDICINE

## 2022-03-14 RX ORDER — LISINOPRIL 20 MG/1
20 TABLET ORAL DAILY
Qty: 90 TABLET | Refills: 3 | Status: SHIPPED | OUTPATIENT
Start: 2022-03-14 | End: 2023-02-28

## 2022-03-14 RX ORDER — HYDROCHLOROTHIAZIDE 12.5 MG/1
12.5 TABLET ORAL DAILY
Qty: 90 TABLET | Refills: 3 | Status: SHIPPED | OUTPATIENT
Start: 2022-03-14 | End: 2023-02-28

## 2022-03-14 RX ORDER — MELATONIN 10 MG
CAPSULE ORAL
COMMUNITY

## 2022-03-14 ASSESSMENT — PAIN SCALES - GENERAL: PAINLEVEL: NO PAIN (0)

## 2022-03-14 NOTE — PROGRESS NOTES
"  Assessment & Plan     (I10) Benign essential hypertension  (primary encounter diagnosis)  Comment: much better control on dual therapy  Plan: hydrochlorothiazide (HYDRODIURIL) 12.5 MG         tablet, lisinopril (ZESTRIL) 20 MG tablet        Continue current meds and doses.  Follow up in a year.     (K21.00) Gastroesophageal reflux disease with esophagitis without hemorrhage  Comment: stable without symptoms on daily medication  Plan: omeprazole (PRILOSEC) 20 MG DR capsule        Continue current med and dose. Refill sent.      25 minutes spent on the date of the encounter doing chart review, history and exam, documentation and further activities per the note       BMI:   Estimated body mass index is 26 kg/m  as calculated from the following:    Height as of this encounter: 1.702 m (5' 7\").    Weight as of this encounter: 75.3 kg (166 lb).   Weight management plan: weight stable, no desire to lose weight.         No follow-ups on file.    Jesse Tavera MD, MD  [facility]MARCEL[facility] is a 42 year old who presents for the following health issues     History of Present Illness       Reason for visit:  Follow up    He eats 2-3 servings of fruits and vegetables daily.He consumes 1 sweetened beverage(s) daily.He exercises with enough effort to increase his heart rate 9 or less minutes per day.  He exercises with enough effort to increase his heart rate 3 or less days per week.   He is taking medications regularly.       Doing well on the medication without side effects.  Labs stable.   No heartburn on prilosec.      Review of Systems   Constitutional, HEENT, cardiovascular, pulmonary, gi and gu systems are negative, except as otherwise noted.      Objective    /86 (Cuff Size: Adult Regular)   Pulse 96   Temp 97.7  F (36.5  C) (Temporal)   Resp 16   Ht 1.702 m (5' 7\")   Wt 75.3 kg (166 lb)   SpO2 98%   BMI 26.00 kg/m    Body mass index is 26 kg/m .  Physical Exam "   GENERAL: healthy, alert and no distress  RESP: lungs clear to auscultation - no rales, rhonchi or wheezes  CV: regular rate and rhythm, normal S1 S2, no S3 or S4, no murmur, click or rub, no peripheral edema and peripheral pulses strong  MS: no edema    Labs stable.

## 2022-03-20 ENCOUNTER — HEALTH MAINTENANCE LETTER (OUTPATIENT)
Age: 42
End: 2022-03-20

## 2022-09-10 ENCOUNTER — HEALTH MAINTENANCE LETTER (OUTPATIENT)
Age: 42
End: 2022-09-10

## 2023-02-27 DIAGNOSIS — I10 BENIGN ESSENTIAL HYPERTENSION: ICD-10-CM

## 2023-02-27 DIAGNOSIS — K21.00 GASTROESOPHAGEAL REFLUX DISEASE WITH ESOPHAGITIS WITHOUT HEMORRHAGE: ICD-10-CM

## 2023-02-28 ENCOUNTER — MYC MEDICAL ADVICE (OUTPATIENT)
Dept: FAMILY MEDICINE | Facility: CLINIC | Age: 43
End: 2023-02-28
Payer: COMMERCIAL

## 2023-02-28 RX ORDER — LISINOPRIL 20 MG/1
TABLET ORAL
Qty: 90 TABLET | Refills: 0 | Status: SHIPPED | OUTPATIENT
Start: 2023-02-28 | End: 2023-05-21

## 2023-02-28 RX ORDER — HYDROCHLOROTHIAZIDE 12.5 MG/1
TABLET ORAL
Qty: 90 TABLET | Refills: 0 | Status: SHIPPED | OUTPATIENT
Start: 2023-02-28 | End: 2023-05-21

## 2023-02-28 NOTE — LETTER
[facility] [address] [address]  Phone: [phone]  Fax: [phone]    March 3, 2023      Catrachito Sawant                                                                                                                                [address] [place] [id number]      Dear Mr. Sawant,    We are concerned about your health care.  We recently provided you with a medication refill.  Many medications require routine follow-up with your Doctor.       At this time we ask that: You schedule an appointment for your annual physical and medications.  Call the clinic at [phone] Option 1 to schedule.      Your prescription:  Has been filled. Please schedule a follow up visit for first available appointment. Courtesy refills will be given if needed until your scheduled appointment.         Thank you,     [facility]

## 2023-02-28 NOTE — TELEPHONE ENCOUNTER
Pending Prescriptions:                       Disp   Refills    hydrochlorothiazide (HYDRODIURIL) 12.5 MG*90 tab*0            Sig: Take 1 tablet by mouth once daily    omeprazole (PRILOSEC) 20 MG DR capsule [P*90 cap*0            Sig: Take 1 capsule by mouth once daily    lisinopril (ZESTRIL) 20 MG tablet [Pharma*90 tab*0            Sig: Take 1 tablet by mouth once daily    Medication is being filled for 1 time ade refill only due to:  Patient is due for yearly follow up    Please call and help schedule.  Thank you!    Sheree Victor RN on 2/28/2023 at 1:34 PM

## 2023-02-28 NOTE — TELEPHONE ENCOUNTER
Patient informed via mychart to schedule. 3/3 reminder if not read to send letter.     Closing encounter.   Rubi Ward MA

## 2023-04-30 ENCOUNTER — HEALTH MAINTENANCE LETTER (OUTPATIENT)
Age: 43
End: 2023-04-30

## 2023-05-21 ENCOUNTER — MYC REFILL (OUTPATIENT)
Dept: FAMILY MEDICINE | Facility: CLINIC | Age: 43
End: 2023-05-21
Payer: COMMERCIAL

## 2023-05-21 DIAGNOSIS — I10 BENIGN ESSENTIAL HYPERTENSION: ICD-10-CM

## 2023-05-21 DIAGNOSIS — K21.00 GASTROESOPHAGEAL REFLUX DISEASE WITH ESOPHAGITIS WITHOUT HEMORRHAGE: ICD-10-CM

## 2023-05-23 NOTE — TELEPHONE ENCOUNTER
Pending Prescriptions:                       Disp   Refills    hydrochlorothiazide (HYDRODIURIL) 12.5 MG *90 tab*0        Sig: Take 1 tablet (12.5 mg) by mouth daily    omeprazole (PRILOSEC) 20 MG DR capsule     90 cap*0        Sig: Take 1 capsule (20 mg) by mouth daily    lisinopril (ZESTRIL) 20 MG tablet          90 tab*0        Sig: Take 1 tablet (20 mg) by mouth daily      Routing refill request to provider for review/approval because:  Mariah given x1 and patient did not follow up, please advise    Sheree Victor RN on 5/23/2023 at 3:32 PM

## 2023-05-24 RX ORDER — HYDROCHLOROTHIAZIDE 12.5 MG/1
12.5 TABLET ORAL DAILY
Qty: 90 TABLET | Refills: 0 | Status: SHIPPED | OUTPATIENT
Start: 2023-05-24 | End: 2023-08-27

## 2023-05-24 RX ORDER — LISINOPRIL 20 MG/1
20 TABLET ORAL DAILY
Qty: 90 TABLET | Refills: 0 | Status: SHIPPED | OUTPATIENT
Start: 2023-05-24 | End: 2023-08-27

## 2023-05-24 NOTE — TELEPHONE ENCOUNTER
Needs yearly appt and labs done. Please make him an appointment.  I gave him a 90 supply.    Electronically signed by:  Jesse Tavera M.D.  5/24/2023

## 2023-08-27 ENCOUNTER — MYC REFILL (OUTPATIENT)
Dept: FAMILY MEDICINE | Facility: CLINIC | Age: 43
End: 2023-08-27
Payer: COMMERCIAL

## 2023-08-27 DIAGNOSIS — K21.00 GASTROESOPHAGEAL REFLUX DISEASE WITH ESOPHAGITIS WITHOUT HEMORRHAGE: ICD-10-CM

## 2023-08-27 DIAGNOSIS — I10 BENIGN ESSENTIAL HYPERTENSION: ICD-10-CM

## 2023-09-05 RX ORDER — HYDROCHLOROTHIAZIDE 12.5 MG/1
12.5 TABLET ORAL DAILY
Qty: 90 TABLET | Refills: 0 | Status: SHIPPED | OUTPATIENT
Start: 2023-09-05 | End: 2023-11-19

## 2023-09-05 RX ORDER — LISINOPRIL 20 MG/1
20 TABLET ORAL DAILY
Qty: 90 TABLET | Refills: 0 | Status: SHIPPED | OUTPATIENT
Start: 2023-09-05 | End: 2023-11-19

## 2023-09-06 NOTE — TELEPHONE ENCOUNTER
Due for his yearly visit and labs.  Please schedule an appt.      Electronically signed by:  Jesse Tavera M.D.  9/5/2023

## 2023-11-19 ENCOUNTER — MYC REFILL (OUTPATIENT)
Dept: FAMILY MEDICINE | Facility: CLINIC | Age: 43
End: 2023-11-19
Payer: COMMERCIAL

## 2023-11-19 DIAGNOSIS — K21.00 GASTROESOPHAGEAL REFLUX DISEASE WITH ESOPHAGITIS WITHOUT HEMORRHAGE: ICD-10-CM

## 2023-11-19 DIAGNOSIS — I10 BENIGN ESSENTIAL HYPERTENSION: ICD-10-CM

## 2023-11-19 DIAGNOSIS — Z13.220 LIPID SCREENING: Primary | ICD-10-CM

## 2023-11-23 RX ORDER — LISINOPRIL 20 MG/1
20 TABLET ORAL DAILY
Qty: 90 TABLET | Refills: 0 | Status: SHIPPED | OUTPATIENT
Start: 2023-11-23 | End: 2024-02-18

## 2023-11-23 RX ORDER — HYDROCHLOROTHIAZIDE 12.5 MG/1
12.5 TABLET ORAL DAILY
Qty: 90 TABLET | Refills: 0 | Status: SHIPPED | OUTPATIENT
Start: 2023-11-23 | End: 2024-02-18

## 2023-11-23 NOTE — TELEPHONE ENCOUNTER
Patient is overdue for an office visit and his labs.  No more refills will be given if he does not make an appointment.  Please get this scheduled for him.    Electronically signed by:  Jesse Tavera M.D.  11/23/2023

## 2023-11-24 ENCOUNTER — MYC MEDICAL ADVICE (OUTPATIENT)
Dept: FAMILY MEDICINE | Facility: CLINIC | Age: 43
End: 2023-11-24
Payer: COMMERCIAL

## 2023-11-26 ENCOUNTER — MYC MEDICAL ADVICE (OUTPATIENT)
Dept: FAMILY MEDICINE | Facility: CLINIC | Age: 43
End: 2023-11-26
Payer: COMMERCIAL

## 2023-12-05 ENCOUNTER — OFFICE VISIT (OUTPATIENT)
Dept: ORTHOPEDICS | Facility: CLINIC | Age: 43
End: 2023-12-05
Payer: COMMERCIAL

## 2023-12-05 ENCOUNTER — ANCILLARY PROCEDURE (OUTPATIENT)
Dept: GENERAL RADIOLOGY | Facility: CLINIC | Age: 43
End: 2023-12-05
Attending: PHYSICIAN ASSISTANT
Payer: COMMERCIAL

## 2023-12-05 VITALS
TEMPERATURE: 97.7 F | DIASTOLIC BLOOD PRESSURE: 86 MMHG | SYSTOLIC BLOOD PRESSURE: 122 MMHG | WEIGHT: 164.5 LBS | BODY MASS INDEX: 27.41 KG/M2 | HEIGHT: 65 IN

## 2023-12-05 DIAGNOSIS — M77.02 MEDIAL EPICONDYLITIS OF ELBOW, LEFT: Primary | ICD-10-CM

## 2023-12-05 DIAGNOSIS — M25.522 LEFT ELBOW PAIN: ICD-10-CM

## 2023-12-05 PROCEDURE — 73080 X-RAY EXAM OF ELBOW: CPT | Mod: TC | Performed by: RADIOLOGY

## 2023-12-05 PROCEDURE — 99203 OFFICE O/P NEW LOW 30 MIN: CPT | Performed by: PHYSICIAN ASSISTANT

## 2023-12-05 ASSESSMENT — PAIN SCALES - GENERAL: PAINLEVEL: MODERATE PAIN (4)

## 2023-12-05 NOTE — LETTER
12/5/2023         RE: Catrachito Sawant  [address] [place] [id number]        Dear Colleague,    Thank you for referring your patient, Catrachito Sawant, to the [facility]. Please see a copy of my visit note below.    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  He enjoys riding [employer] and has a can am.    He is being seen for   Chief Complaints and History of Present Illnesses   Patient presents with     Musculoskeletal Problem     Left elbow pain     Consult     Ref: Self         History of Present Illness:   Mechanism of Injury: No injury fall or trauma  Location: Left medial elbow  Duration of Pain: 1 year but worse over the last 8 months  Rating of Pain: 4 out of 10  Pain Quality: Achy but can be sharp  Pain is better with: Rest  Pain is worse with: Heavy activity  Treatment so far consists of: Tried Tylenol aspirin and ibuprofen just for 100 mg once a day neither one of them has helped all that much.  Has not tried any topicals or any formal therapy or any injections.  Patient has tried a sleeve and that seemed to make things worse.  Associated Features: Denies numbness or tingling shooting burning or electric pain although he has had 2 instances of radicular symptoms but not otherwise in the grand scheme of things he has not had any of this.  Prior history of related problems: No previous surgery or trauma on the left elbow  Pain is Limiting: Heavy use  Here to: Orthopedic consultation  The Pain Has: Gotten worse over the last 8 months  Additional History: Patient was just wants to make sure there is not anything serious.      Patient's past medical, surgical, social and family histories reviewed.     Past Medical History:   Diagnosis Date     Benign essential hypertension         Past Surgical History:   Procedure Laterality Date     OPEN REDUCTION  "INTERNAL FIXATION HAND Left 2003       Medications:  aspirin 81 MG EC tablet, Take 81 mg by mouth daily  hydrochlorothiazide (HYDRODIURIL) 12.5 MG tablet, Take 1 tablet (12.5 mg) by mouth daily  lisinopril (ZESTRIL) 20 MG tablet, Take 1 tablet (20 mg) by mouth daily  Melatonin 10 MG CAPS,   multivitamin w/minerals (MULTI-VITAMIN) tablet, Take 1 tablet by mouth daily  omeprazole (PRILOSEC) 20 MG DR capsule, Take 1 capsule (20 mg) by mouth daily  hydrOXYzine (VISTARIL) 25 MG capsule, Take 1-2 capsules (25-50 mg) by mouth nightly as needed (insomnia) (Patient not taking: Reported on 3/14/2022)    No current facility-administered medications on file prior to visit.      No Known Allergies    Social History     Occupational History     Occupation: [employer]     Employer: [employer]   Tobacco Use     Smoking status: Former     Types: Cigarettes     Start date: 1/1/2009     Smokeless tobacco: Never   Substance and Sexual Activity     Alcohol use: No     Comment: drank heavily when younger     Drug use: No     Sexual activity: Yes     Partners: Female     Birth control/protection: Male Surgical     Comment: had vasectomy       Family History   Problem Relation Age of Onset     Diabetes Mother         1950 (insulin)     Hypertension Mother      Diabetes Brother         8 yrs older (DIET controlled     Hypertension Brother      Arthritis Father      Chronic Obstructive Pulmonary Disease Father 70        1946 (April 2018)     Other - See Comments Sister         7 yrs older     Diabetes Type 2  Brother         5 yrs younger     Hypertension Brother      Other - See Comments Son         2017     Other - See Comments Daughter         2010     Coronary Artery Disease No family hx of        REVIEW OF SYSTEMS  10 point review systems performed otherwise negative as noted as per history of present illness.    Physical Exam:  Vitals: /86   Temp 97.7  F (36.5  C) (Temporal)   Ht 1.659 m (5' 5.3\")   Wt 74.6 kg (164 " lb 8 oz)   BMI 27.12 kg/m    BMI= Body mass index is 27.12 kg/m .    Constitutional: healthy, alert and no acute distress   Psychiatric: mentation appears normal and affect normal/bright  NEURO: no focal deficits, CMS intact left upper extremity   RESP: Normal with easy respirations and no use of accessory muscles to breathe, no audible wheezing or retractions  CV: +2 radial pulse and his hand is warm to palpation.   SKIN: No erythema, rashes, excoriation, or breakdown. No evidence of infection.   MUSCULOSKELETAL:  INSPECTION of left elbow: No gross deformities, erythema, edema, ecchymosis, atrophy or fasciculations.   PALPATION: Medial epicondyl tenderness.  No tenderness lateral or the olecranon or in the cubital fossa.  No tenderness forearm, wrist or upper arm.  No increased warmth.    ROM: Passive: Elbow flexion 155 degrees, elbow extension full, supination 90, pronation 90. The range of motion is without catching locking or pain.      STRENGTH: 5 out of 5 biceps and triceps as well as pronation and supination and  without pain except for flexion and extension at medial epicondyle and also pronation at medial epicondyles.  SPECIAL TEST: Elbow stable to varus and valgus stress  GAIT: non-antalgic  Lymph: no palpable lymph nodes    Diagnostic Modalities:  X-rays done today showing no fracture no dislocation no tumor and good joint spacing and appropriate alignment.  No osteophytes noted.    Independent visualization of the images was performed.    Impression: 1.  Left volar medial epicondylitis    Plan:  All of the above pertinent physical exam and imaging modalities findings was reviewed with Catrachito.    FOCUSED PLAN:  Patient with medial epicondyles pain over the last year and has been much worse over the last 8 months.  This seems to be worse with activity.  Patient is tender in the medial epicondyles today and pain is easily reproduced there.  I recommended ibuprofen 800 mg with food 3 times a day x 5  days.  Discussed possible Voltaren gel but he wanted to hold off on that.  Discussed hand therapy but he wanted to hold off on that.  Discussed home therapy program but he wanted to hold off for now.  Discussed steroid injection which I could do in clinic but overall is slightly safer under ultrasound guidance which we can do with Dr. Suresh.  He will hold on that but he may do that in the future if it does not calm down and he could MyChart us and I could put in an order at that time.  Discussed bracing but we can hold off on that.  Patient mostly wanted to just see if anything major was wrong.  We discussed all his treatment options and he will continue with his own conservative treatment.  Follow-up as needed.    Re-x-ray on return: No      This note was dictated with [employer].    Jose David Hopson PA-C        Again, thank you for allowing me to participate in the care of your patient.        Sincerely,        Jose David Hopson PA-C

## 2023-12-05 NOTE — PROGRESS NOTES
ORTHOPEDIC CONSULT      Chief Complaint: Catrachito Sawant is a 43 year old right hand dominant male who works as a  and is switching over to working for the [employer] in [place].  He enjoys riding [employer] and has a can am.    He is being seen for   Chief Complaints and History of Present Illnesses   Patient presents with    Musculoskeletal Problem     Left elbow pain    Consult     Ref: Self         History of Present Illness:   Mechanism of Injury: No injury fall or trauma  Location: Left medial elbow  Duration of Pain: 1 year but worse over the last 8 months  Rating of Pain: 4 out of 10  Pain Quality: Achy but can be sharp  Pain is better with: Rest  Pain is worse with: Heavy activity  Treatment so far consists of: Tried Tylenol aspirin and ibuprofen just for 100 mg once a day neither one of them has helped all that much.  Has not tried any topicals or any formal therapy or any injections.  Patient has tried a sleeve and that seemed to make things worse.  Associated Features: Denies numbness or tingling shooting burning or electric pain although he has had 2 instances of radicular symptoms but not otherwise in the grand scheme of things he has not had any of this.  Prior history of related problems: No previous surgery or trauma on the left elbow  Pain is Limiting: Heavy use  Here to: Orthopedic consultation  The Pain Has: Gotten worse over the last 8 months  Additional History: Patient was just wants to make sure there is not anything serious.      Patient's past medical, surgical, social and family histories reviewed.     Past Medical History:   Diagnosis Date    Benign essential hypertension         Past Surgical History:   Procedure Laterality Date    OPEN REDUCTION INTERNAL FIXATION HAND Left 2003       Medications:  aspirin 81 MG EC tablet, Take 81 mg by mouth daily  hydrochlorothiazide (HYDRODIURIL) 12.5 MG tablet, Take 1 tablet (12.5 mg) by mouth daily  lisinopril (ZESTRIL) 20 MG tablet, Take 1 tablet (20 mg)  "by mouth daily  Melatonin 10 MG CAPS,   multivitamin w/minerals (MULTI-VITAMIN) tablet, Take 1 tablet by mouth daily  omeprazole (PRILOSEC) 20 MG DR capsule, Take 1 capsule (20 mg) by mouth daily  hydrOXYzine (VISTARIL) 25 MG capsule, Take 1-2 capsules (25-50 mg) by mouth nightly as needed (insomnia) (Patient not taking: Reported on 3/14/2022)    No current facility-administered medications on file prior to visit.      No Known Allergies    Social History     Occupational History    Occupation: Booster     Employer: [employer]   Tobacco Use    Smoking status: Former     Types: Cigarettes     Start date: 1/1/2009    Smokeless tobacco: Never   Substance and Sexual Activity    Alcohol use: No     Comment: drank heavily when younger    Drug use: No    Sexual activity: Yes     Partners: Female     Birth control/protection: Male Surgical     Comment: had vasectomy       Family History   Problem Relation Age of Onset    Diabetes Mother         1950 (insulin)    Hypertension Mother     Diabetes Brother         8 yrs older (DIET controlled    Hypertension Brother     Arthritis Father     Chronic Obstructive Pulmonary Disease Father 70        1946 (April 2018)    Other - See Comments Sister         7 yrs older    Diabetes Type 2  Brother         5 yrs younger    Hypertension Brother     Other - See Comments Son         2017    Other - See Comments Daughter         2010    Coronary Artery Disease No family hx of        REVIEW OF SYSTEMS  10 point review systems performed otherwise negative as noted as per history of present illness.    Physical Exam:  Vitals: /86   Temp 97.7  F (36.5  C) (Temporal)   Ht 1.659 m (5' 5.3\")   Wt 74.6 kg (164 lb 8 oz)   BMI 27.12 kg/m    BMI= Body mass index is 27.12 kg/m .    Constitutional: healthy, alert and no acute distress   Psychiatric: mentation appears normal and affect normal/bright  NEURO: no focal deficits, CMS intact left upper extremity   RESP: Normal with " easy respirations and no use of accessory muscles to breathe, no audible wheezing or retractions  CV: +2 radial pulse and his hand is warm to palpation.   SKIN: No erythema, rashes, excoriation, or breakdown. No evidence of infection.   MUSCULOSKELETAL:  INSPECTION of left elbow: No gross deformities, erythema, edema, ecchymosis, atrophy or fasciculations.   PALPATION: Medial epicondyl tenderness.  No tenderness lateral or the olecranon or in the cubital fossa.  No tenderness forearm, wrist or upper arm.  No increased warmth.    ROM: Passive: Elbow flexion 155 degrees, elbow extension full, supination 90, pronation 90. The range of motion is without catching locking or pain.      STRENGTH: 5 out of 5 biceps and triceps as well as pronation and supination and  without pain except for flexion and extension at medial epicondyle and also pronation at medial epicondyles.  SPECIAL TEST: Elbow stable to varus and valgus stress  GAIT: non-antalgic  Lymph: no palpable lymph nodes    Diagnostic Modalities:  X-rays done today showing no fracture no dislocation no tumor and good joint spacing and appropriate alignment.  No osteophytes noted.    Independent visualization of the images was performed.    Impression: 1.  Left volar medial epicondylitis    Plan:  All of the above pertinent physical exam and imaging modalities findings was reviewed with Catrachito.    FOCUSED PLAN:  Patient with medial epicondyles pain over the last year and has been much worse over the last 8 months.  This seems to be worse with activity.  Patient is tender in the medial epicondyles today and pain is easily reproduced there.  I recommended ibuprofen 800 mg with food 3 times a day x 5 days.  Discussed possible Voltaren gel but he wanted to hold off on that.  Discussed hand therapy but he wanted to hold off on that.  Discussed home therapy program but he wanted to hold off for now.  Discussed steroid injection which I could do in clinic but overall is  slightly safer under ultrasound guidance which we can do with Dr. Suresh.  He will hold on that but he may do that in the future if it does not calm down and he could MyChart us and I could put in an order at that time.  Discussed bracing but we can hold off on that.  Patient mostly wanted to just see if anything major was wrong.  We discussed all his treatment options and he will continue with his own conservative treatment.  Follow-up as needed.    Re-x-ray on return: No      This note was dictated with [employer].    Jose David Hopson PA-C

## 2024-01-03 ENCOUNTER — OFFICE VISIT (OUTPATIENT)
Dept: INTERNAL MEDICINE | Facility: CLINIC | Age: 44
End: 2024-01-03
Payer: COMMERCIAL

## 2024-01-03 VITALS
HEIGHT: 66 IN | SYSTOLIC BLOOD PRESSURE: 118 MMHG | OXYGEN SATURATION: 95 % | TEMPERATURE: 97.4 F | WEIGHT: 171.7 LBS | HEART RATE: 80 BPM | BODY MASS INDEX: 27.59 KG/M2 | DIASTOLIC BLOOD PRESSURE: 74 MMHG | RESPIRATION RATE: 16 BRPM

## 2024-01-03 DIAGNOSIS — Z00.00 ROUTINE GENERAL MEDICAL EXAMINATION AT A HEALTH CARE FACILITY: Primary | ICD-10-CM

## 2024-01-03 DIAGNOSIS — E78.5 HYPERLIPIDEMIA LDL GOAL <130: ICD-10-CM

## 2024-01-03 DIAGNOSIS — I10 BENIGN ESSENTIAL HYPERTENSION: ICD-10-CM

## 2024-01-03 DIAGNOSIS — Z11.59 NEED FOR HEPATITIS C SCREENING TEST: ICD-10-CM

## 2024-01-03 DIAGNOSIS — Z11.4 SCREENING FOR HIV (HUMAN IMMUNODEFICIENCY VIRUS): ICD-10-CM

## 2024-01-03 LAB
ALBUMIN SERPL BCG-MCNC: 4.7 G/DL (ref 3.5–5.2)
ALBUMIN UR-MCNC: NEGATIVE MG/DL
ALP SERPL-CCNC: 111 U/L (ref 40–150)
ALT SERPL W P-5'-P-CCNC: 49 U/L (ref 0–70)
ANION GAP SERPL CALCULATED.3IONS-SCNC: 11 MMOL/L (ref 7–15)
APPEARANCE UR: CLEAR
AST SERPL W P-5'-P-CCNC: 30 U/L (ref 0–45)
BILIRUB SERPL-MCNC: 0.4 MG/DL
BILIRUB UR QL STRIP: NEGATIVE
BUN SERPL-MCNC: 15.8 MG/DL (ref 6–20)
CALCIUM SERPL-MCNC: 9.7 MG/DL (ref 8.6–10)
CHLORIDE SERPL-SCNC: 101 MMOL/L (ref 98–107)
CHOLEST SERPL-MCNC: 253 MG/DL
COLOR UR AUTO: YELLOW
CREAT SERPL-MCNC: 0.75 MG/DL (ref 0.67–1.17)
DEPRECATED HCO3 PLAS-SCNC: 27 MMOL/L (ref 22–29)
EGFRCR SERPLBLD CKD-EPI 2021: >90 ML/MIN/1.73M2
ERYTHROCYTE [DISTWIDTH] IN BLOOD BY AUTOMATED COUNT: 11.9 % (ref 10–15)
FASTING STATUS PATIENT QL REPORTED: NO
GLUCOSE SERPL-MCNC: 127 MG/DL (ref 70–99)
GLUCOSE UR STRIP-MCNC: NEGATIVE MG/DL
HCT VFR BLD AUTO: 44.2 % (ref 40–53)
HCV AB SERPL QL IA: NONREACTIVE
HDLC SERPL-MCNC: 48 MG/DL
HGB BLD-MCNC: 15.6 G/DL (ref 13.3–17.7)
HGB UR QL STRIP: NEGATIVE
KETONES UR STRIP-MCNC: NEGATIVE MG/DL
LDLC SERPL CALC-MCNC: 139 MG/DL
LEUKOCYTE ESTERASE UR QL STRIP: NEGATIVE
MCH RBC QN AUTO: 29.5 PG (ref 26.5–33)
MCHC RBC AUTO-ENTMCNC: 35.3 G/DL (ref 31.5–36.5)
MCV RBC AUTO: 84 FL (ref 78–100)
NITRATE UR QL: NEGATIVE
NONHDLC SERPL-MCNC: 205 MG/DL
PH UR STRIP: 5 [PH] (ref 5–7)
PLATELET # BLD AUTO: 265 10E3/UL (ref 150–450)
POTASSIUM SERPL-SCNC: 4.3 MMOL/L (ref 3.4–5.3)
PROT SERPL-MCNC: 8 G/DL (ref 6.4–8.3)
RBC # BLD AUTO: 5.28 10E6/UL (ref 4.4–5.9)
SODIUM SERPL-SCNC: 139 MMOL/L (ref 135–145)
SP GR UR STRIP: 1.02 (ref 1–1.03)
TRIGL SERPL-MCNC: 332 MG/DL
UROBILINOGEN UR STRIP-MCNC: NORMAL MG/DL
WBC # BLD AUTO: 6.1 10E3/UL (ref 4–11)

## 2024-01-03 PROCEDURE — 36415 COLL VENOUS BLD VENIPUNCTURE: CPT | Performed by: INTERNAL MEDICINE

## 2024-01-03 PROCEDURE — 80053 COMPREHEN METABOLIC PANEL: CPT | Performed by: INTERNAL MEDICINE

## 2024-01-03 PROCEDURE — 86803 HEPATITIS C AB TEST: CPT | Performed by: INTERNAL MEDICINE

## 2024-01-03 PROCEDURE — 87389 HIV-1 AG W/HIV-1&-2 AB AG IA: CPT | Performed by: INTERNAL MEDICINE

## 2024-01-03 PROCEDURE — 80061 LIPID PANEL: CPT | Performed by: INTERNAL MEDICINE

## 2024-01-03 PROCEDURE — 81003 URINALYSIS AUTO W/O SCOPE: CPT | Performed by: INTERNAL MEDICINE

## 2024-01-03 PROCEDURE — 85027 COMPLETE CBC AUTOMATED: CPT | Performed by: INTERNAL MEDICINE

## 2024-01-03 PROCEDURE — 99386 PREV VISIT NEW AGE 40-64: CPT | Performed by: INTERNAL MEDICINE

## 2024-01-03 ASSESSMENT — ENCOUNTER SYMPTOMS
DIZZINESS: 0
COUGH: 0
ARTHRALGIAS: 0
HEARTBURN: 0
NAUSEA: 0
DIARRHEA: 0
HEMATURIA: 0
WEAKNESS: 0
PARESTHESIAS: 0
CONSTIPATION: 0
CHILLS: 0
DYSURIA: 0
NERVOUS/ANXIOUS: 0
FREQUENCY: 0
SHORTNESS OF BREATH: 0
EYE PAIN: 0
HEADACHES: 0
HEMATOCHEZIA: 0
JOINT SWELLING: 0
ABDOMINAL PAIN: 0
FEVER: 0
SORE THROAT: 0
MYALGIAS: 0
PALPITATIONS: 0

## 2024-01-03 ASSESSMENT — PAIN SCALES - GENERAL: PAINLEVEL: NO PAIN (0)

## 2024-01-03 NOTE — PROGRESS NOTES
"SUBJECTIVE:   Catrachito is a 43 year old, presenting for the following:  Physical        1/3/2024     1:08 PM   Additional Questions   Roomed by Krystina TOMLIN       Healthy Habits:     Getting at least 3 servings of Calcium per day:  NO    Bi-annual eye exam:  NO    Dental care twice a year:  Yes    Sleep apnea or symptoms of sleep apnea:  None    Diet:  Regular (no restrictions)    Frequency of exercise:  None    Taking medications regularly:  Yes    Medication side effects:  None    Additional concerns today:  No      Today's PHQ-2 Score:       1/3/2024    12:49 PM   PHQ-2 ( 1999 [employer])   Q1: Little interest or pleasure in doing things 0   Q2: Feeling down, depressed or hopeless 0   PHQ-2 Score 0   Q1: Little interest or pleasure in doing things Not at all   Q2: Feeling down, depressed or hopeless Not at all   PHQ-2 Score 0                 -------------------------------------  Have you ever done Advance Care Planning? (For example, a Health Directive, POLST, or a discussion with a medical provider or your loved ones about your wishes): No, advance care planning information given to patient to review.  Patient plans to discuss their wishes with loved ones or provider.      Social History     Tobacco Use    Smoking status: Former     Types: Cigarettes     Start date: 1/1/2009    Smokeless tobacco: Never   Substance Use Topics    Alcohol use: No     Comment: drank heavily when younger             1/3/2024    12:48 PM   Alcohol Use   Prescreen: >3 drinks/day or >7 drinks/week? Not Applicable       Last PSA: No results found for: \"PSA\"    Reviewed orders with patient. Reviewed health maintenance and updated orders accordingly - Yes  Lab work is in process  Labs reviewed in EPIC  BP Readings from Last 3 Encounters:   01/03/24 118/74   12/05/23 122/86   03/14/22 118/86    Wt Readings from Last 3 Encounters:   01/03/24 77.9 kg (171 lb 11.2 oz)   12/05/23 74.6 kg (164 lb 8 oz)   03/14/22 75.3 kg (166 lb)                "   Patient Active Problem List   Diagnosis    Benign essential hypertension    Gastroesophageal reflux disease without esophagitis    Other insomnia     Past Surgical History:   Procedure Laterality Date    OPEN REDUCTION INTERNAL FIXATION HAND Left 2003       Social History     Tobacco Use    Smoking status: Former     Types: Cigarettes     Start date: 1/1/2009    Smokeless tobacco: Never   Substance Use Topics    Alcohol use: No     Comment: drank heavily when younger     Family History   Problem Relation Age of Onset    Diabetes Mother         1950 (insulin)    Hypertension Mother     Diabetes Brother         8 yrs older (DIET controlled    Hypertension Brother     Arthritis Father     Chronic Obstructive Pulmonary Disease Father 70        1946 (April 2018)    Other - See Comments Sister         7 yrs older    Diabetes Type 2  Brother         5 yrs younger    Hypertension Brother     Other - See Comments Son         2017    Other - See Comments Daughter         2010    Coronary Artery Disease No family hx of          Current Outpatient Medications   Medication Sig Dispense Refill    aspirin 81 MG EC tablet Take 81 mg by mouth daily      hydrochlorothiazide (HYDRODIURIL) 12.5 MG tablet Take 1 tablet (12.5 mg) by mouth daily 90 tablet 0    hydrOXYzine (VISTARIL) 25 MG capsule Take 1-2 capsules (25-50 mg) by mouth nightly as needed (insomnia) 60 capsule 3    lisinopril (ZESTRIL) 20 MG tablet Take 1 tablet (20 mg) by mouth daily 90 tablet 0    Melatonin 10 MG CAPS       multivitamin w/minerals (MULTI-VITAMIN) tablet Take 1 tablet by mouth daily      omeprazole (PRILOSEC) 20 MG DR capsule Take 1 capsule (20 mg) by mouth daily 90 capsule 0     No Known Allergies    Reviewed and updated as needed this visit by clinical staff   Tobacco  Allergies  Meds              Reviewed and updated as needed this visit by Provider                 Past Medical History:   Diagnosis Date    Benign essential hypertension       Past  "Surgical History:   Procedure Laterality Date    OPEN REDUCTION INTERNAL FIXATION HAND Left 2003       Review of Systems   Constitutional:  Negative for chills and fever.   HENT:  Negative for congestion, ear pain, hearing loss and sore throat.    Eyes:  Negative for pain and visual disturbance.   Respiratory:  Negative for cough and shortness of breath.    Cardiovascular:  Negative for chest pain, palpitations and peripheral edema.   Gastrointestinal:  Negative for abdominal pain, constipation, diarrhea, heartburn, hematochezia and nausea.   Genitourinary:  Negative for dysuria, frequency, genital sores, hematuria, impotence, penile discharge and urgency.   Musculoskeletal:  Negative for arthralgias, joint swelling and myalgias.   Skin:  Negative for rash.   Neurological:  Negative for dizziness, weakness, headaches and paresthesias.   Psychiatric/Behavioral:  Negative for mood changes. The patient is not nervous/anxious.          OBJECTIVE:   /74 (BP Location: Right arm, Patient Position: Chair)   Pulse 80   Temp 97.4  F (36.3  C) (Temporal)   Resp 16   Ht 1.679 m (5' 6.1\")   Wt 77.9 kg (171 lb 11.2 oz)   SpO2 95%   BMI 27.63 kg/m      Physical Exam  GENERAL: healthy, alert and no distress  EYES: Eyes grossly normal to inspection, PERRL and conjunctivae and sclerae normal  HENT: ear canals and TM's normal, nose and mouth without ulcers or lesions  NECK: no adenopathy, no asymmetry, masses, or scars and thyroid normal to palpation  RESP: lungs clear to auscultation - no rales, rhonchi or wheezes  CV: regular rate and rhythm, normal S1 S2, no S3 or S4, no murmur, click or rub, no peripheral edema and peripheral pulses strong  ABDOMEN: soft, nontender, no hepatosplenomegaly, no masses and bowel sounds normal  MS: no gross musculoskeletal defects noted, no edema  SKIN: no suspicious lesions or rashes  NEURO: Normal strength and tone, mentation intact and speech normal  PSYCH: mentation appears normal, " "affect normal/bright    Diagnostic Test Results:  Labs reviewed in Epic  No results found for this or any previous visit (from the past 24 hour(s)).    ASSESSMENT/PLAN:       ICD-10-CM    1. Routine general medical examination at a health care facility  Z00.00       2. Benign essential hypertension  I10 CBC with platelets     Comprehensive metabolic panel (BMP + Alb, Alk Phos, ALT, AST, Total. Bili, TP)     UA Macroscopic with reflex to Microscopic and Culture - Lab Collect      3. Hyperlipidemia LDL goal <130  E78.5 Lipid panel reflex to direct LDL Fasting      4. Screening for HIV (human immunodeficiency virus)  Z11.4 HIV Antigen Antibody Combo      5. Need for hepatitis C screening test  Z11.59 Hepatitis C Screen Reflex to HCV RNA Quant and Genotype          Patient has been advised of split billing requirements and indicates understanding: Yes      COUNSELING:   Reviewed preventive health counseling, as reflected in patient instructions       Regular exercise       Healthy diet/nutrition       Vision screening       Hearing screening       Colorectal cancer screening       Prostate cancer screening      BMI:   Estimated body mass index is 27.63 kg/m  as calculated from the following:    Height as of this encounter: 1.679 m (5' 6.1\").    Weight as of this encounter: 77.9 kg (171 lb 11.2 oz).         He reports that he has quit smoking. His smoking use included cigarettes. He started smoking about 15 years ago. He has never used smokeless tobacco.      I have reviewed the patient's vaccination schedule and discussed the benefits of prophylactic vaccination in detail.  I recommend the patient contact their pharmacist for vaccinations.  Discussed that most insurance companies now favor reimbursement to the pharmacies and it will financially behoove the patient to have vaccinations performed at their pharmacy.    The patient opts to decline COVID and influenza vaccination at this time.        Mina Main " DO ANTONY Silva [facility]

## 2024-01-04 LAB — HIV 1+2 AB+HIV1 P24 AG SERPL QL IA: NONREACTIVE

## 2024-02-18 ENCOUNTER — MYC REFILL (OUTPATIENT)
Dept: FAMILY MEDICINE | Facility: CLINIC | Age: 44
End: 2024-02-18
Payer: COMMERCIAL

## 2024-02-18 DIAGNOSIS — I10 BENIGN ESSENTIAL HYPERTENSION: ICD-10-CM

## 2024-02-18 DIAGNOSIS — K21.00 GASTROESOPHAGEAL REFLUX DISEASE WITH ESOPHAGITIS WITHOUT HEMORRHAGE: ICD-10-CM

## 2024-02-19 RX ORDER — HYDROCHLOROTHIAZIDE 12.5 MG/1
12.5 TABLET ORAL DAILY
Qty: 90 TABLET | Refills: 3 | Status: SHIPPED | OUTPATIENT
Start: 2024-02-19 | End: 2024-02-20

## 2024-02-19 RX ORDER — LISINOPRIL 20 MG/1
20 TABLET ORAL DAILY
Qty: 90 TABLET | Refills: 3 | Status: SHIPPED | OUTPATIENT
Start: 2024-02-19 | End: 2024-02-20

## 2024-10-31 ENCOUNTER — TRANSCRIBE ORDERS (OUTPATIENT)
Dept: OTHER | Age: 44
End: 2024-10-31

## 2024-10-31 DIAGNOSIS — R07.9 CHEST PAIN: Primary | ICD-10-CM

## 2024-11-01 ENCOUNTER — TELEPHONE (OUTPATIENT)
Dept: CARDIOLOGY | Facility: CLINIC | Age: 44
End: 2024-11-01
Payer: COMMERCIAL

## 2024-11-01 NOTE — TELEPHONE ENCOUNTER
Immediate care office VISIT      Patient: Saira Mcwilliams Date of Service: 2020   : 1957 MRN: [id number]     SUBJECTIVE:   HISTORY OF PRESENT ILLNESS:  Saira Mcwilliams is a 63 year old female who presents today for     Dry cough with nasal congestion & rhinorrhea x 5 days. No ear pain or sore throat. No SOB or chest tightness.    Pt reports hx of fevers, no chills or body aches.    Pt reports exposure to +COVID person at .    PCP: Dr. Baez        PAST MEDICAL HISTORY:  No past medical history on file.    MEDICATIONS:  Current Outpatient Medications   Medication Sig   • meloxicam (MOBIC) 7.5 MG tablet TAKE 1 TABLET BY MOUTH DAILY   • diclofenac (VOLTAREN) 1 % gel Apply 2 g topically 4 times daily. Apply to the upper extremity   • famotidine (PEPCID) 40 MG tablet TAKE 1 TABLET BY MOUTH AT BEDTIME   • atorvastatin (LIPITOR) 40 MG tablet Take 1 tablet by mouth daily.   • tizanidine (ZANAFLEX) 4 MG capsule Take 1 capsule by mouth 3 times daily.   • Multiple Vitamins-Minerals (MULTI VITAMIN/MINERALS) Tab    • meclizine HCl (ANTIVERT) 25 MG tablet Take 1 tablet by mouth 3 times daily as needed for Dizziness.     No current facility-administered medications for this visit.        ALLERGIES:  ALLERGIES:  No Known Allergies    PAST SURGICAL HISTORY:  Past Surgical History:   Procedure Laterality Date   • Tubal ligation         FAMILY HISTORY:  Family History   Problem Relation Age of Onset   • Cancer, Lung Sister    • Stomach Cancer Brother        SOCIAL HISTORY:  Social History     Tobacco Use   • Smoking status: Never Smoker   • Smokeless tobacco: Never Used   Substance Use Topics   • Alcohol use: Yes     Comment: socially   • Drug use: No       Review of Systems   Constitutional: Positive for fever and malaise/fatigue.   HENT: Positive for congestion.    Eyes: Negative.    Respiratory: Positive for cough.    Cardiovascular: Negative.    Gastrointestinal: Negative.    Genitourinary: Negative.   Writer noted cancellation in [place] on 11/21 with Dr. Adame. Writer called patient and offered appointment and patient accepted. Patient still on waitlist for sooner appointment. Patient aware Dr. Cortes appointment was cancelled due to sooner appointment with Dr. Adame.    TOMÁS Kahn       Musculoskeletal: Negative.    Skin: Negative.    Neurological: Negative.    Endo/Heme/Allergies: Negative.    Psychiatric/Behavioral: Negative.    All other systems reviewed and are negative.        OBJECTIVE:     Physical Exam   Constitutional: She is oriented to person, place, and time and well-developed, well-nourished, and in no distress.   HENT:   Head: Normocephalic and atraumatic.   Right Ear: Tympanic membrane, external ear and ear canal normal.   Left Ear: Tympanic membrane, external ear and ear canal normal.   Nose: Mucosal edema present. Right sinus exhibits no maxillary sinus tenderness and no frontal sinus tenderness. Left sinus exhibits no maxillary sinus tenderness and no frontal sinus tenderness.   Mouth/Throat: Uvula is midline and mucous membranes are normal. Posterior oropharyngeal edema and posterior oropharyngeal erythema present. No oropharyngeal exudate or tonsillar abscesses.   Eyes: Pupils are equal, round, and reactive to light. Conjunctivae and EOM are normal.   Neck: Normal range of motion. Neck supple.   Cardiovascular: Normal rate, regular rhythm, S1 normal, S2 normal, normal heart sounds and intact distal pulses.  No extrasystoles are present. Exam reveals no gallop.   No murmur heard.  Pulmonary/Chest: Effort normal and breath sounds normal. No accessory muscle usage. No tachypnea. No respiratory distress. She has no decreased breath sounds. She has no wheezes. She has no rhonchi. She has no rales.   Abdominal: Soft. Bowel sounds are normal.   Musculoskeletal: Normal range of motion.   Neurological: She is alert and oriented to person, place, and time. Gait normal. GCS score is 15.   Skin: Skin is warm and dry.   Psychiatric: Mood, memory, affect and judgment normal.       Visit Vitals  /81 (BP Location: RUE - Right upper extremity, Patient Position: Sitting, Cuff Size: Regular)   Pulse 88   Temp 99.8 °F (37.7 °C) (Tympanic)   Resp 16   Ht 5' 3\" (1.6 m)   Wt 78.9 kg (174 lb)    SpO2 96%   BMI 30.82 kg/m²       DIAGNOSTIC STUDIES:   LAB RESULTS:    Results for orders placed or performed in visit on 12/11/20   POCT SARS-COV-2 ANTIGEN   Result Value Ref Range    POCT SARS-COV-2 ANTIGEN Not Detected Not Detected   POCT RAPID STREP A   Result Value Ref Range    GRP A STREP Positive (A) Negative    Internal Procedural Controls Acceptable Yes        Assessment AND PLAN:   This is a 63 year old year-old female who presents with .    1. Strep pharyngitis    2. Cough      +strep  -rapid covid 19 test    Pt is requesting injection of pcn for treatment    np will administer pcn 1.2 million units im    np educated pt & family on home care of symptoms + follow up    Instructions provided as documented in the AVS.  Pt to follow up with PCP in 1 week if not improving  Pt educated on emergency symptoms to watch for & when to go to the Emergency Department          The patient indicated understanding of the diagnosis and agreed with the plan of care.

## 2024-11-01 NOTE — TELEPHONE ENCOUNTER
This encounter is being sent to inform the clinic that this patient has a referral from FERNANDO CAIN affiliated with [facility] for the diagnoses of Chest pain [R07.9]   and has requested that this patient be seen within [facility].  Based on the availability of our provider(s), we are unable to accommodate this request.    Were all sites offered this patient?  Yes    Does scheduling algorithm request to schedule next available?  Patient has been scheduled for the first available opening with Montrell Cortes on 12/18/2024.  We have informed the patient that the clinic will review their referral and reach out if a sooner appointment is medically necessary.     Pt preference is [place] and would like to be placed on their waitlist if possible. Pt took [place] since [place] was booking out to 2/27/25. Please review per protocols. Thank you!    Per new pt, over 30 days protocol sending escalation TE for clinic review. Sending to both  and  due to scheduled appt and pt preference locations.     Red Flag Symptom - pt feeling ok today, declined triage.

## 2024-11-14 ENCOUNTER — OFFICE VISIT (OUTPATIENT)
Dept: CARDIOLOGY | Facility: CLINIC | Age: 44
End: 2024-11-14
Payer: COMMERCIAL

## 2024-11-14 VITALS
BODY MASS INDEX: 27.84 KG/M2 | DIASTOLIC BLOOD PRESSURE: 83 MMHG | OXYGEN SATURATION: 96 % | WEIGHT: 173 LBS | SYSTOLIC BLOOD PRESSURE: 126 MMHG | HEART RATE: 81 BPM

## 2024-11-14 DIAGNOSIS — R07.89 OTHER CHEST PAIN: Primary | ICD-10-CM

## 2024-11-14 NOTE — NURSING NOTE
"Chief Complaint   Patient presents with    Chest Pain     New General Cardiology for Establishing care/ Chest Pain    Palpitations    Shortness of Breath    Dizziness       Initial BP (!) 136/91 (BP Location: Left arm, Patient Position: Chair, Cuff Size: Adult Regular)   Pulse 85   Wt 78.5 kg (173 lb)   SpO2 96%   BMI 27.84 kg/m   Estimated body mass index is 27.84 kg/m  as calculated from the following:    Height as of 1/3/24: 1.679 m (5' 6.1\").    Weight as of this encounter: 78.5 kg (173 lb)..  BP completed using cuff size: regular    MAURO Perry    "

## 2024-11-14 NOTE — PATIENT INSTRUCTIONS
Thank you for coming to the [facility]; please note the following instructions:    1. You are scheduled for a CTA Coronary Artery at the [facility] [address] on Tuesday, Dec. 3rd at 1:45 pm arrival.  Check in at the [facility] Lobby on the main entry level.  Please allow 2 hours for this test.  Stay hydrated the day prior to the CT.  No caffeine or nicotine the day of the test.      Please wear loose clothing.  Avoid snaps, zippers and other metal.  They may request for you to undress and put on a gown.    You will lie on a table that will move through the scanner.  You will not be enclosed.  An IV will be started and contrast will be given via IV.  You may receive medication to slow your heart rate.    The scanner will take a series of pictures.  You must lie still.  There is a two way speaker that is used to communicate with the workers.    2.  Results will determine next step      If you have any questions regarding your visit please contact your care team:     Cardiology  Telephone Number   Gaviota ALFREDO., RN  Yana MOREL, RN  Anushka ROCK, GOLDEN ROMANO, MAURO VENTURA, MAURO SERRANO, Visit Facilitator  Tayler MARCANO [facility] [phone] (option 1)   For scheduling appts:     [phone] (select option 1)       For the Device Clinic (Pacemakers and ICD's)  RN's :  Shavonne Leblanc   During business hours: [phone]    *After business hours:  [phone] (select option 4)      Normal test result notifications will be released via [employer] or mailed within 7 business days.  All other test results, will be communicated via telephone once reviewed by your cardiologist.    If you need a medication refill please contact your pharmacy.  Please allow 3 business days for your refill to be completed.    As always, thank you for trusting us with your health care needs!

## 2024-11-14 NOTE — LETTER
11/14/2024      RE: Catrachito Sawant  [address] [place] [id number]       Dear Colleague,    Thank you for the opportunity to participate in the care of your patient, Catrachito Sawant, at the [facility]. Please see a copy of my visit note below.       SUBJECTIVE:  Catrachito Sawant is a 44 year old male who presents for evaluation of atypical chest pain.  Patient is a [employer] employee.  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  Precordial pain with no radiation no associated symptoms.  Pain lasting hours.  No aggravating or relieving factors.  No radiation.  Patient attempted emergency room.  Had normal EKG, normal troponin and other labs including D-dimer patient was discharged home.  Since then patient continues to have severe 1-2/10 lasting for few hours.  Not aggravated by activity.  Patient is concerned about coronary artery disease.  At the emergency room a discussion was also made about anxiety but patient do not think he is an anxious person.  Risk factors are remote history of smoking.  Hypertension.  LDL of 140.  No premature coronary artery disease in family.  No diabetes.  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  According to him evaluation led to EKG and also a stress test.  This was completely normal.    Patient Active Problem List    Diagnosis Date Noted     Gastroesophageal reflux disease without esophagitis 01/07/2021     Priority: Medium     Other insomnia 01/07/2021     Priority: Medium     Benign essential hypertension      Priority: Medium    .  Current Outpatient Medications   Medication Sig Dispense Refill     aspirin 81 MG EC tablet Take 81 mg by mouth daily       hydroCHLOROthiazide (HYDRODIURIL) 12.5 MG tablet Take 1 tablet (12.5 mg) by mouth daily 90 tablet 3     hydrOXYzine (VISTARIL) 25 MG capsule Take 1-2 capsules (25-50 mg) by mouth nightly as  needed (insomnia) 60 capsule 3     lisinopril (ZESTRIL) 20 MG tablet Take 1 tablet (20 mg) by mouth daily 90 tablet 3     Melatonin 10 MG CAPS        multivitamin w/minerals (MULTI-VITAMIN) tablet Take 1 tablet by mouth daily       omeprazole (PRILOSEC) 20 MG DR capsule Take 1 capsule (20 mg) by mouth daily 90 capsule 3     No current facility-administered medications for this visit.     Past Medical History:   Diagnosis Date     Benign essential hypertension      Past Surgical History:   Procedure Laterality Date     OPEN REDUCTION INTERNAL FIXATION HAND Left 2003     No Known Allergies  Social History     Socioeconomic History     Marital status:      Spouse name: Mariluz     Number of children: 2     Years of education: Not on file     Highest education level: Not on file   Occupational History     Occupation: [employer]     Employer: [employer]   Tobacco Use     Smoking status: Former     Types: Cigarettes     Start date: 1/1/2009     Smokeless tobacco: Never   Vaping Use     Vaping status: Never Used   Substance and Sexual Activity     Alcohol use: No     Comment: drank heavily when younger     Drug use: No     Sexual activity: Yes     Partners: Female     Birth control/protection: Male Surgical     Comment: had vasectomy   Other Topics Concern     Parent/sibling w/ CABG, MI or angioplasty before 65F 55M? Not Asked   Social History Narrative     Not on file     Social Drivers of Health     Financial Resource Strain: Low Risk  (1/3/2024)    Financial Resource Strain      Within the past 12 months, have you or your family members you live with been unable to get utilities (heat, electricity) when it was really needed?: No   Food Insecurity: Low Risk  (1/3/2024)    Food Insecurity      Within the past 12 months, did you worry that your food would run out before you got money to buy more?: No      Within the past 12 months, did the food you bought just not last and you didn t have money to get  more?: No   Transportation Needs: Low Risk  (1/3/2024)    Transportation Needs      Within the past 12 months, has lack of transportation kept you from medical appointments, getting your medicines, non-medical meetings or appointments, work, or from getting things that you need?: No   Physical Activity: Unknown (1/7/2021)    Exercise Vital Sign      Days of Exercise per Week: 0 days      Minutes of Exercise per Session: Not asked   Stress: Stress Concern Present (1/7/2021)    [employer] - Occupational Stress Questionnaire      Feeling of Stress : To some extent   Social Connections: Moderately Isolated (1/7/2021)    Social Connection and Isolation Panel [NHANES]      Frequency of Communication with Friends and Family: More than three times a week      Frequency of Social Gatherings with Friends and Family: Once a week      Attends Bahai Services: Never      Active Member of Clubs or Organizations: Not asked      Attends Club or Organization Meetings: Never      Marital Status:    Interpersonal Safety: Not At Risk (10/29/2024)    Received from [facility]     IP Custom IPV      Do you feel UNSAFE in any of your personal relationships with your family members or any other acquaintances?: No   Housing Stability: Low Risk  (1/3/2024)    Housing Stability      Do you have housing? : Yes      Are you worried about losing your housing?: No     Family History   Problem Relation Age of Onset     Diabetes Mother         1950 (insulin)     Hypertension Mother      Diabetes Brother         8 yrs older (DIET controlled     Hypertension Brother      Arthritis Father      Chronic Obstructive Pulmonary Disease Father 70        1946 (April 2018)     Other - See Comments Sister         7 yrs older     Diabetes Type 2  Brother         5 yrs younger     Hypertension Brother      Other - See Comments Son         2017     Other - See Comments Daughter          2010     Coronary Artery Disease No family hx of           REVIEW OF SYSTEMS:  General: negative, fever, chills, night sweats  Skin: negative, acne, rash, and scaling  Eyes: negative, double vision, eye pain, and photophobia  Ears/Nose/Throat: negative, nasal congestion, and purulent rhinorrhea  Respiratory: No dyspnea on exertion, No cough, No hemoptysis, and negative  Cardiovascular: negative, palpitations, tachycardia, irregular heart beat, exertional chest pain or pressure, paroxysmal nocturnal dyspnea, dyspnea on exertion, and orthopnea         OBJECTIVE:  Blood pressure (!) 136/91, pulse 85, weight 78.5 kg (173 lb), SpO2 96%.  General Appearance: healthy, alert, active, and no distress  Head: Normocephalic. No masses, lesions, tenderness or abnormalities  Eyes: conjuctiva clear, PERRL, EOM intact  Ears: External ears normal. Canals clear. TM's normal.  Nose: Nares normal  Mouth: normal  Neck: Supple, no cervical adenopathy, no thyromegaly  Lungs: clear to auscultation  Cardiac: regular rate and rhythm, normal S1 and S2, no murmur       ASSESSMENT/PLAN:  Patient here for evaluation of atypical chest pain.  Precordial chest pain 1 to 2 x 10 in intensity no radiation or associated symptoms.  No aggravating or relieving factors.  Chest pain can last for hours.  Recently patient visited emergency room with symptoms.  Had normal EKG, normal troponin and also normal D-dimer.  Unremarkable chest x-ray patient was discharged home.  Here because of lingering chest pain.  For unknown reason patient presented with right groin pain 5 years ago to the emergency room.  Subsequent to this visit he had an exercise stress echo which was completely normal.  Currently patient is concerned about coronary artery disease.  His risk factors are remote history of smoking, hypertension and mildly elevated LDL.  No no diabetes.  No premature coronary artery disease in family.  Discussed options with patient.  Repeating an exercise  stress test or the coronary angiogram which can detect early disease also.  Patient opted for CT coronary angiogram.  Will arrange a CTA and patient will be contacted with the test result.  Meanwhile he is advised to continue his current medications which includes hydrochlorothiazide 12.5 mg daily and lisinopril 20 mg daily.  Total visit duration 45 minutes.  This included face-to-face interview, physical exam, chart review, review of EKGs, prior stress test, emergency room records and documentation.      Please do not hesitate to contact me if you have any questions/concerns.     Sincerely,     LUCAS Martinez MD

## 2024-11-14 NOTE — PROGRESS NOTES
SUBJECTIVE:  Catrachito Sawant is a 44 year old male who presents for evaluation of atypical chest pain.  Patient is a [employer] employee.  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  Precordial pain with no radiation no associated symptoms.  Pain lasting hours.  No aggravating or relieving factors.  No radiation.  Patient attempted emergency room.  Had normal EKG, normal troponin and other labs including D-dimer patient was discharged home.  Since then patient continues to have severe 1-2/10 lasting for few hours.  Not aggravated by activity.  Patient is concerned about coronary artery disease.  At the emergency room a discussion was also made about anxiety but patient do not think he is an anxious person.  Risk factors are remote history of smoking.  Hypertension.  LDL of 140.  No premature coronary artery disease in family.  No diabetes.  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  According to him evaluation led to EKG and also a stress test.  This was completely normal.    Patient Active Problem List    Diagnosis Date Noted    Gastroesophageal reflux disease without esophagitis 01/07/2021     Priority: Medium    Other insomnia 01/07/2021     Priority: Medium    Benign essential hypertension      Priority: Medium    .  Current Outpatient Medications   Medication Sig Dispense Refill    aspirin 81 MG EC tablet Take 81 mg by mouth daily      hydroCHLOROthiazide (HYDRODIURIL) 12.5 MG tablet Take 1 tablet (12.5 mg) by mouth daily 90 tablet 3    hydrOXYzine (VISTARIL) 25 MG capsule Take 1-2 capsules (25-50 mg) by mouth nightly as needed (insomnia) 60 capsule 3    lisinopril (ZESTRIL) 20 MG tablet Take 1 tablet (20 mg) by mouth daily 90 tablet 3    Melatonin 10 MG CAPS       multivitamin w/minerals (MULTI-VITAMIN) tablet Take 1 tablet by mouth daily      omeprazole (PRILOSEC) 20 MG DR capsule Take 1 capsule (20 mg) by mouth daily 90 capsule 3     No current  facility-administered medications for this visit.     Past Medical History:   Diagnosis Date    Benign essential hypertension      Past Surgical History:   Procedure Laterality Date    OPEN REDUCTION INTERNAL FIXATION HAND Left 2003     No Known Allergies  Social History     Socioeconomic History    Marital status:      Spouse name: Mariluz    Number of children: 2    Years of education: Not on file    Highest education level: Not on file   Occupational History    Occupation: Backand     Employer: [employer]   Tobacco Use    Smoking status: Former     Types: Cigarettes     Start date: 1/1/2009    Smokeless tobacco: Never   Vaping Use    Vaping status: Never Used   Substance and Sexual Activity    Alcohol use: No     Comment: drank heavily when younger    Drug use: No    Sexual activity: Yes     Partners: Female     Birth control/protection: Male Surgical     Comment: had vasectomy   Other Topics Concern    Parent/sibling w/ CABG, MI or angioplasty before 65F 55M? Not Asked   Social History Narrative    Not on file     Social Drivers of Health     Financial Resource Strain: Low Risk  (1/3/2024)    Financial Resource Strain     Within the past 12 months, have you or your family members you live with been unable to get utilities (heat, electricity) when it was really needed?: No   Food Insecurity: Low Risk  (1/3/2024)    Food Insecurity     Within the past 12 months, did you worry that your food would run out before you got money to buy more?: No     Within the past 12 months, did the food you bought just not last and you didn t have money to get more?: No   Transportation Needs: Low Risk  (1/3/2024)    Transportation Needs     Within the past 12 months, has lack of transportation kept you from medical appointments, getting your medicines, non-medical meetings or appointments, work, or from getting things that you need?: No   Physical Activity: Unknown (1/7/2021)    Exercise Vital Sign     Days of  Exercise per Week: 0 days     Minutes of Exercise per Session: Not asked   Stress: Stress Concern Present (1/7/2021)    [employer] - Occupational Stress Questionnaire     Feeling of Stress : To some extent   Social Connections: Moderately Isolated (1/7/2021)    Social Connection and Isolation Panel [NHANES]     Frequency of Communication with Friends and Family: More than three times a week     Frequency of Social Gatherings with Friends and Family: Once a week     Attends Latter-day Services: Never     Active Member of Clubs or Organizations: Not asked     Attends Club or Organization Meetings: Never     Marital Status:    Interpersonal Safety: Not At Risk (10/29/2024)    Received from [facility] and [facility] IP Custom IPV     Do you feel UNSAFE in any of your personal relationships with your family members or any other acquaintances?: No   Housing Stability: Low Risk  (1/3/2024)    Housing Stability     Do you have housing? : Yes     Are you worried about losing your housing?: No     Family History   Problem Relation Age of Onset    Diabetes Mother         1950 (insulin)    Hypertension Mother     Diabetes Brother         8 yrs older (DIET controlled    Hypertension Brother     Arthritis Father     Chronic Obstructive Pulmonary Disease Father 70        1946 (April 2018)    Other - See Comments Sister         7 yrs older    Diabetes Type 2  Brother         5 yrs younger    Hypertension Brother     Other - See Comments Son         2017    Other - See Comments Daughter         2010    Coronary Artery Disease No family hx of           REVIEW OF SYSTEMS:  General: negative, fever, chills, night sweats  Skin: negative, acne, rash, and scaling  Eyes: negative, double vision, eye pain, and photophobia  Ears/Nose/Throat: negative, nasal congestion, and purulent rhinorrhea  Respiratory: No dyspnea on exertion, No cough, No hemoptysis, and  negative  Cardiovascular: negative, palpitations, tachycardia, irregular heart beat, exertional chest pain or pressure, paroxysmal nocturnal dyspnea, dyspnea on exertion, and orthopnea         OBJECTIVE:  Blood pressure (!) 136/91, pulse 85, weight 78.5 kg (173 lb), SpO2 96%.  General Appearance: healthy, alert, active, and no distress  Head: Normocephalic. No masses, lesions, tenderness or abnormalities  Eyes: conjuctiva clear, PERRL, EOM intact  Ears: External ears normal. Canals clear. TM's normal.  Nose: Nares normal  Mouth: normal  Neck: Supple, no cervical adenopathy, no thyromegaly  Lungs: clear to auscultation  Cardiac: regular rate and rhythm, normal S1 and S2, no murmur       ASSESSMENT/PLAN:  Patient here for evaluation of atypical chest pain.  Precordial chest pain 1 to 2 x 10 in intensity no radiation or associated symptoms.  No aggravating or relieving factors.  Chest pain can last for hours.  Recently patient visited emergency room with symptoms.  Had normal EKG, normal troponin and also normal D-dimer.  Unremarkable chest x-ray patient was discharged home.  Here because of lingering chest pain.  For unknown reason patient presented with right groin pain 5 years ago to the emergency room.  Subsequent to this visit he had an exercise stress echo which was completely normal.  Currently patient is concerned about coronary artery disease.  His risk factors are remote history of smoking, hypertension and mildly elevated LDL.  No no diabetes.  No premature coronary artery disease in family.  Discussed options with patient.  Repeating an exercise stress test or the coronary angiogram which can detect early disease also.  Patient opted for CT coronary angiogram.  Will arrange a CTA and patient will be contacted with the test result.  Meanwhile he is advised to continue his current medications which includes hydrochlorothiazide 12.5 mg daily and lisinopril 20 mg daily.  Total visit duration 45 minutes.  This  included face-to-face interview, physical exam, chart review, review of EKGs, prior stress test, emergency room records and documentation.

## 2024-12-03 ENCOUNTER — HOSPITAL ENCOUNTER (OUTPATIENT)
Dept: CT IMAGING | Facility: CLINIC | Age: 44
Discharge: HOME | End: 2024-12-03
Attending: INTERNAL MEDICINE
Payer: COMMERCIAL

## 2024-12-03 VITALS — OXYGEN SATURATION: 97 % | SYSTOLIC BLOOD PRESSURE: 104 MMHG | HEART RATE: 70 BPM | DIASTOLIC BLOOD PRESSURE: 55 MMHG

## 2024-12-03 DIAGNOSIS — R07.89 OTHER CHEST PAIN: ICD-10-CM

## 2024-12-03 PROCEDURE — 250N000009 HC RX 250: Performed by: STUDENT IN AN ORGANIZED HEALTH CARE EDUCATION/TRAINING PROGRAM

## 2024-12-03 PROCEDURE — 75574 CT ANGIO HRT W/3D IMAGE: CPT

## 2024-12-03 PROCEDURE — 250N000011 HC RX IP 250 OP 636: Performed by: STUDENT IN AN ORGANIZED HEALTH CARE EDUCATION/TRAINING PROGRAM

## 2024-12-03 PROCEDURE — 250N000013 HC RX MED GY IP 250 OP 250 PS 637: Performed by: STUDENT IN AN ORGANIZED HEALTH CARE EDUCATION/TRAINING PROGRAM

## 2024-12-03 RX ORDER — METOPROLOL TARTRATE 1 MG/ML
5-20 INJECTION, SOLUTION INTRAVENOUS
Status: DISCONTINUED | OUTPATIENT
Start: 2024-12-03 | End: 2024-12-04 | Stop reason: HOSPADM

## 2024-12-03 RX ORDER — LIDOCAINE 40 MG/G
CREAM TOPICAL
Status: DISCONTINUED | OUTPATIENT
Start: 2024-12-03 | End: 2024-12-04 | Stop reason: HOSPADM

## 2024-12-03 RX ORDER — IOPAMIDOL 755 MG/ML
110 INJECTION, SOLUTION INTRAVASCULAR ONCE
Status: CANCELLED | OUTPATIENT
Start: 2024-12-03 | End: 2024-12-03

## 2024-12-03 RX ORDER — METOPROLOL TARTRATE 25 MG/1
25-100 TABLET, FILM COATED ORAL
Status: COMPLETED | OUTPATIENT
Start: 2024-12-03 | End: 2024-12-03

## 2024-12-03 RX ORDER — NITROGLYCERIN 0.4 MG/1
.4-.8 TABLET SUBLINGUAL
Status: DISCONTINUED | OUTPATIENT
Start: 2024-12-03 | End: 2024-12-04 | Stop reason: HOSPADM

## 2024-12-03 RX ORDER — IVABRADINE 5 MG/1
5-15 TABLET, FILM COATED ORAL
Status: COMPLETED | OUTPATIENT
Start: 2024-12-03 | End: 2024-12-03

## 2024-12-03 RX ORDER — DILTIAZEM HYDROCHLORIDE 5 MG/ML
10-15 INJECTION INTRAVENOUS
Status: DISCONTINUED | OUTPATIENT
Start: 2024-12-03 | End: 2024-12-04 | Stop reason: HOSPADM

## 2024-12-03 RX ORDER — ONDANSETRON 2 MG/ML
4 INJECTION INTRAMUSCULAR; INTRAVENOUS
Status: DISCONTINUED | OUTPATIENT
Start: 2024-12-03 | End: 2024-12-04 | Stop reason: HOSPADM

## 2024-12-03 RX ORDER — IOPAMIDOL 755 MG/ML
110 INJECTION, SOLUTION INTRAVASCULAR ONCE
Status: COMPLETED | OUTPATIENT
Start: 2024-12-03 | End: 2024-12-03

## 2024-12-03 RX ORDER — DILTIAZEM HYDROCHLORIDE 120 MG/1
120 TABLET, FILM COATED ORAL
Status: DISCONTINUED | OUTPATIENT
Start: 2024-12-03 | End: 2024-12-04 | Stop reason: HOSPADM

## 2024-12-03 RX ADMIN — IVABRADINE 15 MG: 5 TABLET, FILM COATED ORAL at 14:08

## 2024-12-03 RX ADMIN — NITROGLYCERIN 0.8 MG: 0.4 TABLET SUBLINGUAL at 15:13

## 2024-12-03 RX ADMIN — IOPAMIDOL 110 ML: 755 INJECTION, SOLUTION INTRAVENOUS at 15:09

## 2024-12-03 RX ADMIN — METOPROLOL TARTRATE 10 MG: 1 INJECTION, SOLUTION INTRAVENOUS at 15:02

## 2024-12-03 RX ADMIN — METOPROLOL TARTRATE 100 MG: 100 TABLET, FILM COATED ORAL at 14:08

## 2024-12-03 RX ADMIN — METOPROLOL TARTRATE 10 MG: 1 INJECTION, SOLUTION INTRAVENOUS at 15:19

## 2024-12-03 NOTE — PROGRESS NOTES
Pt arrived for Coronary CT angiogram. Test, meds and side effects reviewed with pt. Resting HR 90 bpm; given 100 mg PO Metoprolol + 15 mg PO Ivabradine per verbal order. Administered 0.8 mg SL nitro and a total of 20 mg IV metoprolol in two divided doses of 10 mg each on CT table per order. CTA completed; tolerated procedure well and denies symptoms of allergic reaction. Post monitoring completed and VSS. D/C instructions reviewed with pt whom verbalized understanding of need to increase PO fluids today. D/C to gold waiting room accompanied by staff.

## 2024-12-05 ENCOUNTER — MYC MEDICAL ADVICE (OUTPATIENT)
Dept: CARDIOLOGY | Facility: CLINIC | Age: 44
End: 2024-12-05
Payer: COMMERCIAL

## 2024-12-05 ENCOUNTER — TELEPHONE (OUTPATIENT)
Dept: CARDIOLOGY | Facility: CLINIC | Age: 44
End: 2024-12-05
Payer: COMMERCIAL

## 2024-12-05 DIAGNOSIS — I25.10 CAD (CORONARY ARTERY DISEASE): Primary | ICD-10-CM

## 2024-12-05 RX ORDER — ROSUVASTATIN CALCIUM 10 MG/1
10 TABLET, COATED ORAL DAILY
Qty: 90 TABLET | Refills: 3 | Status: SHIPPED | OUTPATIENT
Start: 2024-12-05

## 2024-12-05 NOTE — TELEPHONE ENCOUNTER
[facility] Call Center    Phone Message    May a detailed message be left on voicemail: yes     Reason for Call: Other: Patient has some additional questions regarding the CT test results and message he received in [employer]. I was unable to get through the priority line. Please call the patient back to discuss.      Action Taken: Other: cardiology    Travel Screening: Not Applicable  Thank you!  [facility]       Date of Service:

## 2024-12-05 NOTE — TELEPHONE ENCOUNTER
Spoke to patient and reviewed CTA coronary in depth.  Answered all questions and concerns.  Patient verbalized understanding.      Gaviota Locke RN  Cardiology Care Coordinator  [facility]  [phone] option 1

## 2025-01-05 SDOH — HEALTH STABILITY: PHYSICAL HEALTH: ON AVERAGE, HOW MANY DAYS PER WEEK DO YOU ENGAGE IN MODERATE TO STRENUOUS EXERCISE (LIKE A BRISK WALK)?: 2 DAYS

## 2025-01-05 SDOH — HEALTH STABILITY: PHYSICAL HEALTH: ON AVERAGE, HOW MANY MINUTES DO YOU ENGAGE IN EXERCISE AT THIS LEVEL?: 30 MIN

## 2025-01-05 ASSESSMENT — SOCIAL DETERMINANTS OF HEALTH (SDOH): HOW OFTEN DO YOU GET TOGETHER WITH FRIENDS OR RELATIVES?: ONCE A WEEK

## 2025-01-08 ENCOUNTER — OFFICE VISIT (OUTPATIENT)
Dept: FAMILY MEDICINE | Facility: CLINIC | Age: 45
End: 2025-01-08
Payer: COMMERCIAL

## 2025-01-08 ENCOUNTER — LAB (OUTPATIENT)
Dept: LAB | Facility: CLINIC | Age: 45
End: 2025-01-08
Payer: COMMERCIAL

## 2025-01-08 VITALS
SYSTOLIC BLOOD PRESSURE: 122 MMHG | HEART RATE: 81 BPM | RESPIRATION RATE: 17 BRPM | BODY MASS INDEX: 26.65 KG/M2 | OXYGEN SATURATION: 98 % | WEIGHT: 165.8 LBS | TEMPERATURE: 97.2 F | HEIGHT: 66 IN | DIASTOLIC BLOOD PRESSURE: 85 MMHG

## 2025-01-08 DIAGNOSIS — K21.00 GASTROESOPHAGEAL REFLUX DISEASE WITH ESOPHAGITIS WITHOUT HEMORRHAGE: ICD-10-CM

## 2025-01-08 DIAGNOSIS — I10 BENIGN ESSENTIAL HYPERTENSION: Primary | ICD-10-CM

## 2025-01-08 DIAGNOSIS — Z00.00 ANNUAL PHYSICAL EXAM: Primary | ICD-10-CM

## 2025-01-08 DIAGNOSIS — I10 BENIGN ESSENTIAL HYPERTENSION: ICD-10-CM

## 2025-01-08 DIAGNOSIS — I25.10 ASCVD (ARTERIOSCLEROTIC CARDIOVASCULAR DISEASE): ICD-10-CM

## 2025-01-08 LAB
HOLD SPECIMEN: NORMAL
HOLD SPECIMEN: NORMAL

## 2025-01-08 PROCEDURE — 99214 OFFICE O/P EST MOD 30 MIN: CPT | Mod: 25 | Performed by: FAMILY MEDICINE

## 2025-01-08 PROCEDURE — 90715 TDAP VACCINE 7 YRS/> IM: CPT | Performed by: FAMILY MEDICINE

## 2025-01-08 PROCEDURE — 99396 PREV VISIT EST AGE 40-64: CPT | Mod: 25 | Performed by: FAMILY MEDICINE

## 2025-01-08 PROCEDURE — G2211 COMPLEX E/M VISIT ADD ON: HCPCS | Performed by: FAMILY MEDICINE

## 2025-01-08 PROCEDURE — 90471 IMMUNIZATION ADMIN: CPT | Performed by: FAMILY MEDICINE

## 2025-01-08 RX ORDER — LISINOPRIL 20 MG/1
20 TABLET ORAL DAILY
Qty: 90 TABLET | Refills: 3 | Status: SHIPPED | OUTPATIENT
Start: 2025-01-08

## 2025-01-08 RX ORDER — HYDROCHLOROTHIAZIDE 12.5 MG/1
12.5 TABLET ORAL DAILY
Qty: 90 TABLET | Refills: 3 | Status: SHIPPED | OUTPATIENT
Start: 2025-01-08

## 2025-01-08 ASSESSMENT — PAIN SCALES - GENERAL: PAINLEVEL_OUTOF10: NO PAIN (0)

## 2025-01-08 NOTE — NURSING NOTE
Prior to immunization administration, verified patients identity using patient s name and date of birth. Please see Immunization Activity for additional information.     Screening Questionnaire for Adult Immunization    Are you sick today?   No   Do you have allergies to medications, food, a vaccine component or latex?   No   Have you ever had a serious reaction after receiving a vaccination?   No   Do you have a long-term health problem with heart, lung, kidney, or metabolic disease (e.g., diabetes), asthma, a blood disorder, no spleen, complement component deficiency, a cochlear implant, or a spinal fluid leak?  Are you on long-term aspirin therapy?   No   Do you have cancer, leukemia, HIV/AIDS, or any other immune system problem?   No   Do you have a parent, brother, or sister with an immune system problem?   No   In the past 3 months, have you taken medications that affect  your immune system, such as prednisone, other steroids, or anticancer drugs; drugs for the treatment of rheumatoid arthritis, Crohn s disease, or psoriasis; or have you had radiation treatments?   No   Have you had a seizure, or a brain or other nervous system problem?   No   During the past year, have you received a transfusion of blood or blood    products, or been given immune (gamma) globulin or antiviral drug?   No   For women: Are you pregnant or is there a chance you could become       pregnant during the next month?   No   Have you received any vaccinations in the past 4 weeks?   No     Immunization questionnaire answers were all negative.      Patient instructed to remain in clinic for 15 minutes afterwards, and to report any adverse reactions.     Screening performed by Freida Johnson CMA on 1/8/2025 at 9:24 AM.

## 2025-01-08 NOTE — PROGRESS NOTES
Preventive Care Visit  [facility]  Alex Haley MD, Family Medicine  Jan 8, 2025      Assessment & Plan       ICD-10-CM    1. Annual physical exam  Z00.00 TDAP 10-64Y (ADACEL,BOOSTRIX)      2. Benign essential hypertension  I10 BASIC METABOLIC PANEL     Comprehensive metabolic panel (BMP + Alb, Alk Phos, ALT, AST, Total. Bili, TP)     Lipid panel reflex to direct LDL Fasting     hydroCHLOROthiazide 12.5 MG tablet     lisinopril (ZESTRIL) 20 MG tablet      3. CAD (coronary artery disease)  I25.10       4. Gastroesophageal reflux disease with esophagitis without hemorrhage  K21.00 omeprazole (PRILOSEC) 20 MG DR capsule           Annual exam topics covered.  Immunizations updated.  Family history updated.    ASCVD.  Mild.  Noted on screening imaging.  Appropriately started on Crestor 10 mg.  Baseline LDL runs 130-140.  Expect that to reduce by 50%.  Below 70 would be a reasonable goal.  He agrees.  Also discussed the importance of regular moderate intensity exercise, Mediterranean diet, and stress management.  He agrees.  Plan to see him back in 3 months for LFTs and lipid panel.  I will see him in office in 6 months.    Hypertension.  At goal today.  Continue same.  Continue to watch electrolytes.  Routine lab work ordered.    Return in about 6 months (around 7/8/2025) for recheck.    Alex Haley MD  [facility]     The longitudinal plan of care for the diagnosis(es)/condition(s) as documented were addressed during this visit. Due to the added complexity in care, I will continue to support Catrachito in the subsequent management and with ongoing continuity of care.     Subjective   Catrachito is a 44 year old, presenting for the following:  Physical and CT Results (From 12/03/2024)        1/8/2025     7:42 AM   Additional Questions   Roomed by Grant LYLES      Reviewed CTA, mild obs  Not checking home bps'  Feels well  Started exercising  Lft mildly  elevated    Health Care Directive  Patient does not have a Health Care Directive: Discussed advance care planning with patient; however, patient declined at this time.      1/5/2025   General Health   How would you rate your overall physical health? Good   Feel stress (tense, anxious, or unable to sleep) Patient declined         1/5/2025   Nutrition   Three or more servings of calcium each day? Yes   Diet: Low salt    Low fat/cholesterol   How many servings of fruit and vegetables per day? (!) 2-3   How many sweetened beverages each day? 0-1       Multiple values from one day are sorted in reverse-chronological order         1/5/2025   Exercise   Days per week of moderate/strenous exercise 2 days   Average minutes spent exercising at this level 30 min   (!) EXERCISE CONCERN      1/5/2025   Social Factors   Frequency of gathering with friends or relatives Once a week   Worry food won't last until get money to buy more No   Food not last or not have enough money for food? No   Do you have housing? (Housing is defined as stable permanent housing and does not include staying ouside in a car, in a tent, in an abandoned building, in an overnight shelter, or couch-surfing.) Yes   Are you worried about losing your housing? No   Lack of transportation? No   Unable to get utilities (heat,electricity)? No         1/5/2025   Dental   Dentist two times every year? Yes         1/5/2025   TB Screening   Were you born outside of the [place]? Yes         Today's PHQ-2 Score:       1/7/2025     8:29 AM   PHQ-2 ( 1999 [employer])   Q1: Little interest or pleasure in doing things 0   Q2: Feeling down, depressed or hopeless 0   PHQ-2 Score 0    Q1: Little interest or pleasure in doing things Not at all   Q2: Feeling down, depressed or hopeless Not at all   PHQ-2 Score 0       Patient-reported           1/5/2025   Substance Use   Alcohol more than 3/day or more than 7/wk Not Applicable   Do you use any other substances recreationally? No  "    Social History     Tobacco Use    Smoking status: Former     Types: Cigarettes     Start date: 1/1/2009    Smokeless tobacco: Never   Vaping Use    Vaping status: Never Used   Substance Use Topics    Alcohol use: No     Comment: drank heavily when younger    Drug use: No           1/5/2025   STI Screening   New sexual partner(s) since last STI/HIV test? No   ASCVD Risk   The 10-year ASCVD risk score (Negar STEVE, et al., 2019) is: 2.8%    Values used to calculate the score:      Age: 44 years      Sex: Male      Is Non- : No      Diabetic: No      Tobacco smoker: No      Systolic Blood Pressure: 122 mmHg      Is BP treated: Yes      HDL Cholesterol: 48 mg/dL      Total Cholesterol: 253 mg/dL       Reviewed and updated as needed this visit by Provider                          Review of Systems  Constitutional, HEENT, cardiovascular, pulmonary, gi and gu systems are negative, except as otherwise noted.     Objective    Exam  /85   Pulse 81   Temp 97.2  F (36.2  C) (Temporal)   Resp 17   Ht 1.675 m (5' 5.95\")   Wt 75.2 kg (165 lb 12.8 oz)   SpO2 98%   BMI 26.81 kg/m     Estimated body mass index is 26.81 kg/m  as calculated from the following:    Height as of this encounter: 1.675 m (5' 5.95\").    Weight as of this encounter: 75.2 kg (165 lb 12.8 oz).    Physical Exam  GENERAL: alert and no distress  NECK: no adenopathy, no asymmetry, masses, or scars  RESP: lungs clear to auscultation - no rales, rhonchi or wheezes  CV: regular rate and rhythm, normal S1 S2, no S3 or S4, no murmur, click or rub, no peripheral edema  ABDOMEN: soft, nontender, no hepatosplenomegaly, no masses and bowel sounds normal  MS: no gross musculoskeletal defects noted, no edema    CTA reviewed showing mild obstructive disease, but 83rd percentile for coronary calcium    Signed Electronically by: Alex Haley MD    "

## 2025-03-05 ENCOUNTER — LAB (OUTPATIENT)
Dept: LAB | Facility: CLINIC | Age: 45
End: 2025-03-05
Payer: COMMERCIAL

## 2025-03-05 DIAGNOSIS — I25.10 CAD (CORONARY ARTERY DISEASE): ICD-10-CM

## 2025-03-05 DIAGNOSIS — I10 BENIGN ESSENTIAL HYPERTENSION: ICD-10-CM

## 2025-03-05 LAB
ALBUMIN SERPL BCG-MCNC: 4.7 G/DL (ref 3.5–5.2)
ALP SERPL-CCNC: 103 U/L (ref 40–150)
ALT SERPL W P-5'-P-CCNC: 35 U/L (ref 0–70)
ANION GAP SERPL CALCULATED.3IONS-SCNC: 13 MMOL/L (ref 7–15)
AST SERPL W P-5'-P-CCNC: 21 U/L (ref 0–45)
BILIRUB DIRECT SERPL-MCNC: 0.22 MG/DL (ref 0–0.3)
BILIRUB SERPL-MCNC: 0.8 MG/DL
BUN SERPL-MCNC: 15.2 MG/DL (ref 6–20)
CALCIUM SERPL-MCNC: 10 MG/DL (ref 8.8–10.4)
CHLORIDE SERPL-SCNC: 101 MMOL/L (ref 98–107)
CHOLEST SERPL-MCNC: 181 MG/DL
CREAT SERPL-MCNC: 0.72 MG/DL (ref 0.67–1.17)
EGFRCR SERPLBLD CKD-EPI 2021: >90 ML/MIN/1.73M2
FASTING STATUS PATIENT QL REPORTED: YES
FASTING STATUS PATIENT QL REPORTED: YES
GLUCOSE SERPL-MCNC: 106 MG/DL (ref 70–99)
HCO3 SERPL-SCNC: 25 MMOL/L (ref 22–29)
HDLC SERPL-MCNC: 52 MG/DL
LDLC SERPL CALC-MCNC: 109 MG/DL
NONHDLC SERPL-MCNC: 129 MG/DL
POTASSIUM SERPL-SCNC: 4 MMOL/L (ref 3.4–5.3)
PROT SERPL-MCNC: 8.1 G/DL (ref 6.4–8.3)
SODIUM SERPL-SCNC: 139 MMOL/L (ref 135–145)
TRIGL SERPL-MCNC: 99 MG/DL

## 2025-03-05 PROCEDURE — 80053 COMPREHEN METABOLIC PANEL: CPT

## 2025-03-05 PROCEDURE — 82248 BILIRUBIN DIRECT: CPT

## 2025-03-05 PROCEDURE — 36415 COLL VENOUS BLD VENIPUNCTURE: CPT

## 2025-03-05 PROCEDURE — 80061 LIPID PANEL: CPT

## 2025-08-06 ENCOUNTER — OFFICE VISIT (OUTPATIENT)
Dept: FAMILY MEDICINE | Facility: CLINIC | Age: 45
End: 2025-08-06
Payer: COMMERCIAL

## 2025-08-06 VITALS
OXYGEN SATURATION: 97 % | SYSTOLIC BLOOD PRESSURE: 122 MMHG | RESPIRATION RATE: 16 BRPM | WEIGHT: 170.6 LBS | BODY MASS INDEX: 27.42 KG/M2 | HEART RATE: 83 BPM | HEIGHT: 66 IN | DIASTOLIC BLOOD PRESSURE: 78 MMHG | TEMPERATURE: 98.5 F

## 2025-08-06 DIAGNOSIS — I10 BENIGN ESSENTIAL HYPERTENSION: Primary | ICD-10-CM

## 2025-08-06 DIAGNOSIS — I25.10 ASCVD (ARTERIOSCLEROTIC CARDIOVASCULAR DISEASE): ICD-10-CM

## 2025-08-06 DIAGNOSIS — R91.8 ABNORMAL CT SCAN OF LUNG: ICD-10-CM

## 2025-08-06 LAB
CHOLEST SERPL-MCNC: 184 MG/DL
FASTING STATUS PATIENT QL REPORTED: NO
HDLC SERPL-MCNC: 51 MG/DL
LDLC SERPL CALC-MCNC: 103 MG/DL
NONHDLC SERPL-MCNC: 133 MG/DL
TRIGL SERPL-MCNC: 149 MG/DL

## 2025-08-06 PROCEDURE — 99214 OFFICE O/P EST MOD 30 MIN: CPT | Performed by: FAMILY MEDICINE

## 2025-08-06 PROCEDURE — 80061 LIPID PANEL: CPT | Performed by: FAMILY MEDICINE

## 2025-08-06 PROCEDURE — 36415 COLL VENOUS BLD VENIPUNCTURE: CPT | Performed by: FAMILY MEDICINE

## 2025-08-06 ASSESSMENT — PAIN SCALES - GENERAL: PAINLEVEL_OUTOF10: NO PAIN (0)

## (undated) RX ORDER — NITROGLYCERIN 0.4 MG/1
TABLET SUBLINGUAL
Status: DISPENSED
Start: 2024-12-03

## (undated) RX ORDER — IVABRADINE 5 MG/1
TABLET, FILM COATED ORAL
Status: DISPENSED
Start: 2024-12-03

## (undated) RX ORDER — METOPROLOL TARTRATE 1 MG/ML
INJECTION, SOLUTION INTRAVENOUS
Status: DISPENSED
Start: 2024-12-03

## (undated) RX ORDER — METOPROLOL TARTRATE 100 MG/1
TABLET ORAL
Status: DISPENSED
Start: 2024-12-03